# Patient Record
Sex: FEMALE | Race: OTHER | Employment: UNEMPLOYED | URBAN - METROPOLITAN AREA
[De-identification: names, ages, dates, MRNs, and addresses within clinical notes are randomized per-mention and may not be internally consistent; named-entity substitution may affect disease eponyms.]

---

## 2018-01-08 ENCOUNTER — OFFICE VISIT (OUTPATIENT)
Dept: FAMILY MEDICINE CLINIC | Facility: CLINIC | Age: 58
End: 2018-01-08

## 2018-01-08 VITALS
TEMPERATURE: 99 F | BODY MASS INDEX: 30 KG/M2 | RESPIRATION RATE: 16 BRPM | HEART RATE: 75 BPM | WEIGHT: 161 LBS | SYSTOLIC BLOOD PRESSURE: 150 MMHG | OXYGEN SATURATION: 98 % | DIASTOLIC BLOOD PRESSURE: 90 MMHG

## 2018-01-08 DIAGNOSIS — J01.00 ACUTE NON-RECURRENT MAXILLARY SINUSITIS: Primary | ICD-10-CM

## 2018-01-08 DIAGNOSIS — R03.0 ELEVATED BLOOD PRESSURE READING: ICD-10-CM

## 2018-01-08 DIAGNOSIS — R05.9 COUGH: ICD-10-CM

## 2018-01-08 PROCEDURE — 99202 OFFICE O/P NEW SF 15 MIN: CPT | Performed by: PHYSICIAN ASSISTANT

## 2018-01-08 RX ORDER — BENZONATATE 200 MG/1
200 CAPSULE ORAL 3 TIMES DAILY PRN
Qty: 15 CAPSULE | Refills: 0 | Status: SHIPPED | OUTPATIENT
Start: 2018-01-08 | End: 2018-08-23

## 2018-01-08 RX ORDER — AMOXICILLIN AND CLAVULANATE POTASSIUM 875; 125 MG/1; MG/1
1 TABLET, FILM COATED ORAL 2 TIMES DAILY
Qty: 10 TABLET | Refills: 0 | Status: SHIPPED | OUTPATIENT
Start: 2018-01-08 | End: 2018-01-13

## 2018-01-08 NOTE — PATIENT INSTRUCTIONS
Sinusitis: Autocuidados     Beber abundante cantidad de agua puede ayudar a drenar los senos paranasales.      La sinusitis suele poder controlarse con cuidados personales, saida por ejemplo mantener húmedos los senos paranasales para aumentar pryor comodidad Date Last Reviewed: 5/16/2014  © 1303-1433 [employer]. [address] [facility] [place] [facility]. Todos los derechos reservados.  Esta información no pretende sustituir la atención médica profesional. Sólo pryor médico puede diagnosticar y trata

## 2018-01-08 NOTE — PROGRESS NOTES
CHIEF COMPLAINT:   Patient presents with:  Cough: symptoms of nasal congestion and phlegm over 5 days, tried muscinex helped a little  Sinus Problem: sinus congestion and pain worsening over the last few days      HPI:   Adams White is a 61year old fem REVIEW OF SYSTEMS:   GENERAL:  good appetite  SKIN: no rashes or abnormal skin lesions  HEENT: See HPI.     LUNGS: denies shortness of breath or wheezing  CARDIOVASCULAR: denies chest pain or palpitations   GI: denies abdominal pain or nausea  NEURO: mi relieve pressure, motrin with food to decrease sinus inflammation. Risks, benefits, and side effects of medication explained and discussed. Medications as listed below.           benzonatate 200 MG Oral Cap      Amoxicillin-Pot Clavulanate 875-125 MG Oral

## 2018-07-14 ENCOUNTER — LAB ENCOUNTER (OUTPATIENT)
Dept: LAB | Age: 58
End: 2018-07-14
Attending: ORTHOPAEDIC SURGERY
Payer: COMMERCIAL

## 2018-07-14 DIAGNOSIS — Z01.818 PREOP TESTING: ICD-10-CM

## 2018-07-14 LAB
ANTIBODY SCREEN: NEGATIVE
RH BLOOD TYPE: POSITIVE

## 2018-07-14 PROCEDURE — 86850 RBC ANTIBODY SCREEN: CPT

## 2018-07-14 PROCEDURE — 86900 BLOOD TYPING SEROLOGIC ABO: CPT

## 2018-07-14 PROCEDURE — 86901 BLOOD TYPING SEROLOGIC RH(D): CPT

## 2018-07-14 PROCEDURE — 87641 MR-STAPH DNA AMP PROBE: CPT

## 2018-07-14 PROCEDURE — 36415 COLL VENOUS BLD VENIPUNCTURE: CPT

## 2018-07-15 LAB — MRSA DNA SPEC QL NAA+PROBE: NEGATIVE

## 2018-08-23 RX ORDER — ASCORBIC ACID 500 MG
500 TABLET ORAL DAILY
COMMUNITY

## 2018-08-23 RX ORDER — ACETAMINOPHEN 500 MG
500 TABLET ORAL EVERY 6 HOURS PRN
COMMUNITY

## 2018-08-23 RX ORDER — OMEGA-3-ACID ETHYL ESTERS 1 G/1
1 CAPSULE, LIQUID FILLED ORAL DAILY
COMMUNITY

## 2018-08-24 ENCOUNTER — LAB ENCOUNTER (OUTPATIENT)
Dept: LAB | Age: 58
End: 2018-08-24
Attending: ORTHOPAEDIC SURGERY
Payer: COMMERCIAL

## 2018-08-24 DIAGNOSIS — Z01.818 PRE-OP TESTING: ICD-10-CM

## 2018-08-24 LAB
ALBUMIN SERPL BCP-MCNC: 4.3 G/DL (ref 3.5–4.8)
ALBUMIN/GLOB SERPL: 1.8 {RATIO} (ref 1–2)
ALP SERPL-CCNC: 58 U/L (ref 32–100)
ALT SERPL-CCNC: 16 U/L (ref 14–54)
ANION GAP SERPL CALC-SCNC: 9 MMOL/L (ref 0–18)
ANTIBODY SCREEN: NEGATIVE
APTT PPP: 31.4 SECONDS (ref 23.2–35.3)
AST SERPL-CCNC: 16 U/L (ref 15–41)
BASOPHILS # BLD: 0.1 K/UL (ref 0–0.2)
BASOPHILS NFR BLD: 1 %
BILIRUB SERPL-MCNC: 0.5 MG/DL (ref 0.3–1.2)
BILIRUB UR QL: NEGATIVE
BUN SERPL-MCNC: 13 MG/DL (ref 8–20)
BUN/CREAT SERPL: 21.7 (ref 10–20)
CALCIUM SERPL-MCNC: 9.5 MG/DL (ref 8.5–10.5)
CHLORIDE SERPL-SCNC: 105 MMOL/L (ref 95–110)
CLARITY UR: CLEAR
CO2 SERPL-SCNC: 22 MMOL/L (ref 22–32)
COLOR UR: YELLOW
CREAT SERPL-MCNC: 0.6 MG/DL (ref 0.5–1.5)
EOSINOPHIL # BLD: 0.1 K/UL (ref 0–0.7)
EOSINOPHIL NFR BLD: 1 %
ERYTHROCYTE [DISTWIDTH] IN BLOOD BY AUTOMATED COUNT: 13.4 % (ref 11–15)
GLOBULIN PLAS-MCNC: 2.4 G/DL (ref 2.5–3.7)
GLUCOSE SERPL-MCNC: 100 MG/DL (ref 70–99)
GLUCOSE UR-MCNC: NEGATIVE MG/DL
HCT VFR BLD AUTO: 41.8 % (ref 35–48)
HGB BLD-MCNC: 14.2 G/DL (ref 12–16)
HGB UR QL STRIP.AUTO: NEGATIVE
INR BLD: 1 (ref 0.9–1.2)
KETONES UR-MCNC: NEGATIVE MG/DL
LEUKOCYTE ESTERASE UR QL STRIP.AUTO: NEGATIVE
LYMPHOCYTES # BLD: 1.7 K/UL (ref 1–4)
LYMPHOCYTES NFR BLD: 32 %
MCH RBC QN AUTO: 31.1 PG (ref 27–32)
MCHC RBC AUTO-ENTMCNC: 33.9 G/DL (ref 32–37)
MCV RBC AUTO: 91.7 FL (ref 80–100)
MONOCYTES # BLD: 0.3 K/UL (ref 0–1)
MONOCYTES NFR BLD: 6 %
NEUTROPHILS # BLD AUTO: 3.1 K/UL (ref 1.8–7.7)
NEUTROPHILS NFR BLD: 60 %
NITRITE UR QL STRIP.AUTO: NEGATIVE
OSMOLALITY UR CALC.SUM OF ELEC: 282 MOSM/KG (ref 275–295)
PATIENT FASTING: YES
PH UR: 5 [PH] (ref 5–8)
PLATELET # BLD AUTO: 220 K/UL (ref 140–400)
PMV BLD AUTO: 10.4 FL (ref 7.4–10.3)
POTASSIUM SERPL-SCNC: 4.2 MMOL/L (ref 3.3–5.1)
PROT SERPL-MCNC: 6.7 G/DL (ref 5.9–8.4)
PROT UR-MCNC: NEGATIVE MG/DL
PROTHROMBIN TIME: 12.5 SECONDS (ref 11.8–14.5)
RBC # BLD AUTO: 4.56 M/UL (ref 3.7–5.4)
RH BLOOD TYPE: POSITIVE
SODIUM SERPL-SCNC: 136 MMOL/L (ref 136–144)
SP GR UR STRIP: 1.02 (ref 1–1.03)
UROBILINOGEN UR STRIP-ACNC: <2
VIT C UR-MCNC: NEGATIVE MG/DL
WBC # BLD AUTO: 5.2 K/UL (ref 4–11)

## 2018-08-24 PROCEDURE — 85730 THROMBOPLASTIN TIME PARTIAL: CPT

## 2018-08-24 PROCEDURE — 36415 COLL VENOUS BLD VENIPUNCTURE: CPT

## 2018-08-24 PROCEDURE — 85025 COMPLETE CBC W/AUTO DIFF WBC: CPT

## 2018-08-24 PROCEDURE — 80053 COMPREHEN METABOLIC PANEL: CPT

## 2018-08-24 PROCEDURE — 81003 URINALYSIS AUTO W/O SCOPE: CPT

## 2018-08-24 PROCEDURE — 85610 PROTHROMBIN TIME: CPT

## 2018-08-24 PROCEDURE — 86850 RBC ANTIBODY SCREEN: CPT

## 2018-08-24 PROCEDURE — 86900 BLOOD TYPING SEROLOGIC ABO: CPT

## 2018-08-24 PROCEDURE — 86901 BLOOD TYPING SEROLOGIC RH(D): CPT

## 2018-08-27 NOTE — H&P
[address] Patient Status:  Surgery Admit    1960 MRN [id number]   [place] [address] Attending Nimco Mcgee, *   Hosp Day # 0 PCP Isaias Shepherd MD 08/24/2018   K 4.2 08/24/2018    08/24/2018   CO2 22 08/24/2018    (H) 08/24/2018   CA 9.5 08/24/2018   ALB 4.3 08/24/2018   ALKPHO 58 08/24/2018   BILT 0.5 08/24/2018   TP 6.7 08/24/2018   AST 16 08/24/2018   ALT 16 08/24/2018   PTT 31.4 08/2

## 2018-08-28 ENCOUNTER — HOSPITAL ENCOUNTER (INPATIENT)
Facility: HOSPITAL | Age: 58
LOS: 2 days | Discharge: HOME HEALTH | DRG: 470 | End: 2018-08-30
Attending: ORTHOPAEDIC SURGERY | Admitting: ORTHOPAEDIC SURGERY
Payer: COMMERCIAL

## 2018-08-28 ENCOUNTER — ANESTHESIA (OUTPATIENT)
Dept: SURGERY | Facility: HOSPITAL | Age: 58
DRG: 470 | End: 2018-08-28
Payer: COMMERCIAL

## 2018-08-28 ENCOUNTER — ANESTHESIA EVENT (OUTPATIENT)
Dept: SURGERY | Facility: HOSPITAL | Age: 58
DRG: 470 | End: 2018-08-28
Payer: COMMERCIAL

## 2018-08-28 ENCOUNTER — APPOINTMENT (OUTPATIENT)
Dept: GENERAL RADIOLOGY | Facility: HOSPITAL | Age: 58
DRG: 470 | End: 2018-08-28
Attending: PHYSICIAN ASSISTANT
Payer: COMMERCIAL

## 2018-08-28 ENCOUNTER — SURGERY (OUTPATIENT)
Age: 58
End: 2018-08-28

## 2018-08-28 DIAGNOSIS — Z01.818 PRE-OP TESTING: Primary | ICD-10-CM

## 2018-08-28 DIAGNOSIS — M17.11 OSTEOARTHRITIS OF RIGHT KNEE: ICD-10-CM

## 2018-08-28 LAB
ERYTHROCYTE [DISTWIDTH] IN BLOOD BY AUTOMATED COUNT: 13.6 % (ref 11–15)
HCT VFR BLD AUTO: 39 % (ref 35–48)
HGB BLD-MCNC: 13 G/DL (ref 12–16)
MCH RBC QN AUTO: 30.8 PG (ref 27–32)
MCHC RBC AUTO-ENTMCNC: 33.5 G/DL (ref 32–37)
MCV RBC AUTO: 92.1 FL (ref 80–100)
PLATELET # BLD AUTO: 173 K/UL (ref 140–400)
PMV BLD AUTO: 9.4 FL (ref 7.4–10.3)
RBC # BLD AUTO: 4.23 M/UL (ref 3.7–5.4)
WBC # BLD AUTO: 5.9 K/UL (ref 4–11)

## 2018-08-28 PROCEDURE — 73560 X-RAY EXAM OF KNEE 1 OR 2: CPT | Performed by: PHYSICIAN ASSISTANT

## 2018-08-28 PROCEDURE — 0SRC0J9 REPLACEMENT OF RIGHT KNEE JOINT WITH SYNTHETIC SUBSTITUTE, CEMENTED, OPEN APPROACH: ICD-10-PCS | Performed by: ORTHOPAEDIC SURGERY

## 2018-08-28 PROCEDURE — 99232 SBSQ HOSP IP/OBS MODERATE 35: CPT | Performed by: HOSPITALIST

## 2018-08-28 DEVICE — RESURFACING PATELLA SIZE 2
Type: IMPLANTABLE DEVICE | Site: KNEE | Status: FUNCTIONAL
Brand: GMK PRIMARY TOTAL KNEE SYSTEM

## 2018-08-28 DEVICE — BONE CEMENT HI VISCOSITY R+G: Type: IMPLANTABLE DEVICE | Site: KNEE | Status: FUNCTIONAL

## 2018-08-28 DEVICE — FIXED TIBIAL TRAY CEMENTED RIGHT, SIZE 2
Type: IMPLANTABLE DEVICE | Site: KNEE | Status: FUNCTIONAL
Brand: GMK PRIMARY TOTAL KNEE SYSTEM

## 2018-08-28 RX ORDER — DIPHENHYDRAMINE HCL 25 MG
25 CAPSULE ORAL EVERY 4 HOURS PRN
Status: ACTIVE | OUTPATIENT
Start: 2018-08-28 | End: 2018-08-29

## 2018-08-28 RX ORDER — ACETAMINOPHEN 500 MG
1000 TABLET ORAL ONCE
Status: DISCONTINUED | OUTPATIENT
Start: 2018-08-28 | End: 2018-08-28

## 2018-08-28 RX ORDER — HALOPERIDOL 5 MG/ML
0.5 INJECTION INTRAMUSCULAR ONCE AS NEEDED
Status: ACTIVE | OUTPATIENT
Start: 2018-08-28 | End: 2018-08-28

## 2018-08-28 RX ORDER — CELECOXIB 200 MG/1
200 CAPSULE ORAL EVERY 12 HOURS SCHEDULED
Status: DISCONTINUED | OUTPATIENT
Start: 2018-08-28 | End: 2018-08-30

## 2018-08-28 RX ORDER — MELATONIN
325
Status: DISCONTINUED | OUTPATIENT
Start: 2018-08-29 | End: 2018-08-30

## 2018-08-28 RX ORDER — FAMOTIDINE 20 MG/1
20 TABLET ORAL ONCE
Status: COMPLETED | OUTPATIENT
Start: 2018-08-28 | End: 2018-08-28

## 2018-08-28 RX ORDER — EPHEDRINE SULFATE 50 MG/ML
INJECTION, SOLUTION INTRAVENOUS AS NEEDED
Status: DISCONTINUED | OUTPATIENT
Start: 2018-08-28 | End: 2018-08-28 | Stop reason: SURG

## 2018-08-28 RX ORDER — BISACODYL 10 MG
10 SUPPOSITORY, RECTAL RECTAL
Status: DISCONTINUED | OUTPATIENT
Start: 2018-08-28 | End: 2018-08-30

## 2018-08-28 RX ORDER — DIPHENHYDRAMINE HYDROCHLORIDE 50 MG/ML
12.5 INJECTION INTRAMUSCULAR; INTRAVENOUS EVERY 4 HOURS PRN
Status: DISCONTINUED | OUTPATIENT
Start: 2018-08-28 | End: 2018-08-30

## 2018-08-28 RX ORDER — FAMOTIDINE 20 MG/1
20 TABLET ORAL 2 TIMES DAILY
Status: DISCONTINUED | OUTPATIENT
Start: 2018-08-28 | End: 2018-08-30

## 2018-08-28 RX ORDER — ONDANSETRON 2 MG/ML
4 INJECTION INTRAMUSCULAR; INTRAVENOUS ONCE AS NEEDED
Status: ACTIVE | OUTPATIENT
Start: 2018-08-28 | End: 2018-08-28

## 2018-08-28 RX ORDER — SODIUM CHLORIDE, SODIUM LACTATE, POTASSIUM CHLORIDE, CALCIUM CHLORIDE 600; 310; 30; 20 MG/100ML; MG/100ML; MG/100ML; MG/100ML
INJECTION, SOLUTION INTRAVENOUS CONTINUOUS
Status: DISCONTINUED | OUTPATIENT
Start: 2018-08-28 | End: 2018-08-30

## 2018-08-28 RX ORDER — ONDANSETRON 2 MG/ML
4 INJECTION INTRAMUSCULAR; INTRAVENOUS EVERY 4 HOURS PRN
Status: DISCONTINUED | OUTPATIENT
Start: 2018-08-28 | End: 2018-08-30

## 2018-08-28 RX ORDER — HYDROCODONE BITARTRATE AND ACETAMINOPHEN 7.5; 325 MG/1; MG/1
2 TABLET ORAL EVERY 6 HOURS PRN
Status: ACTIVE | OUTPATIENT
Start: 2018-08-28 | End: 2018-08-29

## 2018-08-28 RX ORDER — ACETAMINOPHEN 325 MG/1
650 TABLET ORAL EVERY 4 HOURS PRN
Status: DISCONTINUED | OUTPATIENT
Start: 2018-08-28 | End: 2018-08-30

## 2018-08-28 RX ORDER — BUPIVACAINE HYDROCHLORIDE AND EPINEPHRINE 5; 5 MG/ML; UG/ML
INJECTION, SOLUTION PERINEURAL AS NEEDED
Status: DISCONTINUED | OUTPATIENT
Start: 2018-08-28 | End: 2018-08-28 | Stop reason: HOSPADM

## 2018-08-28 RX ORDER — GABAPENTIN 300 MG/1
300 CAPSULE ORAL NIGHTLY
Status: DISCONTINUED | OUTPATIENT
Start: 2018-08-28 | End: 2018-08-30

## 2018-08-28 RX ORDER — HYDROCODONE BITARTRATE AND ACETAMINOPHEN 7.5; 325 MG/1; MG/1
1 TABLET ORAL EVERY 4 HOURS PRN
Status: DISCONTINUED | OUTPATIENT
Start: 2018-08-28 | End: 2018-08-30

## 2018-08-28 RX ORDER — POLYETHYLENE GLYCOL 3350 17 G/17G
17 POWDER, FOR SOLUTION ORAL DAILY PRN
Status: DISCONTINUED | OUTPATIENT
Start: 2018-08-28 | End: 2018-08-30

## 2018-08-28 RX ORDER — HYDROCODONE BITARTRATE AND ACETAMINOPHEN 7.5; 325 MG/1; MG/1
2 TABLET ORAL EVERY 4 HOURS PRN
Status: DISCONTINUED | OUTPATIENT
Start: 2018-08-28 | End: 2018-08-30

## 2018-08-28 RX ORDER — CEFAZOLIN SODIUM/WATER 2 G/20 ML
2 SYRINGE (ML) INTRAVENOUS ONCE
Status: COMPLETED | OUTPATIENT
Start: 2018-08-28 | End: 2018-08-28

## 2018-08-28 RX ORDER — MIDAZOLAM HYDROCHLORIDE 1 MG/ML
INJECTION INTRAMUSCULAR; INTRAVENOUS AS NEEDED
Status: DISCONTINUED | OUTPATIENT
Start: 2018-08-28 | End: 2018-08-28 | Stop reason: SURG

## 2018-08-28 RX ORDER — HYDROCODONE BITARTRATE AND ACETAMINOPHEN 7.5; 325 MG/1; MG/1
1 TABLET ORAL EVERY 6 HOURS PRN
Status: DISPENSED | OUTPATIENT
Start: 2018-08-28 | End: 2018-08-29

## 2018-08-28 RX ORDER — DIPHENHYDRAMINE HCL 25 MG
25 CAPSULE ORAL EVERY 4 HOURS PRN
Status: DISCONTINUED | OUTPATIENT
Start: 2018-08-28 | End: 2018-08-30

## 2018-08-28 RX ORDER — CELECOXIB 200 MG/1
400 CAPSULE ORAL ONCE
Status: COMPLETED | OUTPATIENT
Start: 2018-08-28 | End: 2018-08-28

## 2018-08-28 RX ORDER — SODIUM PHOSPHATE, DIBASIC AND SODIUM PHOSPHATE, MONOBASIC 7; 19 G/133ML; G/133ML
1 ENEMA RECTAL ONCE AS NEEDED
Status: DISCONTINUED | OUTPATIENT
Start: 2018-08-28 | End: 2018-08-30

## 2018-08-28 RX ORDER — MORPHINE SULFATE 1 MG/ML
INJECTION, SOLUTION EPIDURAL; INTRATHECAL; INTRAVENOUS AS NEEDED
Status: DISCONTINUED | OUTPATIENT
Start: 2018-08-28 | End: 2018-08-28 | Stop reason: SURG

## 2018-08-28 RX ORDER — ACETAMINOPHEN 325 MG/1
650 TABLET ORAL EVERY 6 HOURS PRN
Status: ACTIVE | OUTPATIENT
Start: 2018-08-28 | End: 2018-08-29

## 2018-08-28 RX ORDER — METOCLOPRAMIDE 10 MG/1
10 TABLET ORAL ONCE
Status: COMPLETED | OUTPATIENT
Start: 2018-08-28 | End: 2018-08-28

## 2018-08-28 RX ORDER — NALBUPHINE HCL 10 MG/ML
2.5 AMPUL (ML) INJECTION EVERY 4 HOURS PRN
Status: ACTIVE | OUTPATIENT
Start: 2018-08-28 | End: 2018-08-29

## 2018-08-28 RX ORDER — METOCLOPRAMIDE HYDROCHLORIDE 5 MG/ML
10 INJECTION INTRAMUSCULAR; INTRAVENOUS EVERY 6 HOURS PRN
Status: DISCONTINUED | OUTPATIENT
Start: 2018-08-28 | End: 2018-08-30

## 2018-08-28 RX ORDER — ASPIRIN 325 MG
325 TABLET ORAL 2 TIMES DAILY
Status: DISCONTINUED | OUTPATIENT
Start: 2018-08-28 | End: 2018-08-30

## 2018-08-28 RX ORDER — SODIUM CHLORIDE 0.9 % (FLUSH) 0.9 %
10 SYRINGE (ML) INJECTION AS NEEDED
Status: DISCONTINUED | OUTPATIENT
Start: 2018-08-28 | End: 2018-08-30

## 2018-08-28 RX ORDER — BUPIVACAINE HYDROCHLORIDE 7.5 MG/ML
INJECTION, SOLUTION EPIDURAL; RETROBULBAR AS NEEDED
Status: DISCONTINUED | OUTPATIENT
Start: 2018-08-28 | End: 2018-08-28 | Stop reason: SURG

## 2018-08-28 RX ORDER — GABAPENTIN 300 MG/1
600 CAPSULE ORAL ONCE
Status: COMPLETED | OUTPATIENT
Start: 2018-08-28 | End: 2018-08-28

## 2018-08-28 RX ORDER — DIPHENHYDRAMINE HYDROCHLORIDE 50 MG/ML
25 INJECTION INTRAMUSCULAR; INTRAVENOUS ONCE AS NEEDED
Status: ACTIVE | OUTPATIENT
Start: 2018-08-28 | End: 2018-08-28

## 2018-08-28 RX ORDER — NALOXONE HYDROCHLORIDE 0.4 MG/ML
0.08 INJECTION, SOLUTION INTRAMUSCULAR; INTRAVENOUS; SUBCUTANEOUS
Status: ACTIVE | OUTPATIENT
Start: 2018-08-28 | End: 2018-08-29

## 2018-08-28 RX ORDER — FAMOTIDINE 10 MG/ML
20 INJECTION, SOLUTION INTRAVENOUS 2 TIMES DAILY
Status: DISCONTINUED | OUTPATIENT
Start: 2018-08-28 | End: 2018-08-30

## 2018-08-28 RX ORDER — DIPHENHYDRAMINE HYDROCHLORIDE 50 MG/ML
12.5 INJECTION INTRAMUSCULAR; INTRAVENOUS EVERY 4 HOURS PRN
Status: ACTIVE | OUTPATIENT
Start: 2018-08-28 | End: 2018-08-29

## 2018-08-28 RX ORDER — DOCUSATE SODIUM 100 MG/1
100 CAPSULE, LIQUID FILLED ORAL 2 TIMES DAILY
Status: DISCONTINUED | OUTPATIENT
Start: 2018-08-28 | End: 2018-08-30

## 2018-08-28 RX ORDER — ACETAMINOPHEN 500 MG
1000 TABLET ORAL ONCE
Status: DISCONTINUED | OUTPATIENT
Start: 2018-08-28 | End: 2018-08-28 | Stop reason: HOSPADM

## 2018-08-28 RX ORDER — DEXTROSE, SODIUM CHLORIDE, AND POTASSIUM CHLORIDE 5; .45; .15 G/100ML; G/100ML; G/100ML
INJECTION INTRAVENOUS CONTINUOUS
Status: DISCONTINUED | OUTPATIENT
Start: 2018-08-28 | End: 2018-08-30

## 2018-08-28 RX ORDER — SENNOSIDES 8.6 MG
17.2 TABLET ORAL NIGHTLY
Status: DISCONTINUED | OUTPATIENT
Start: 2018-08-28 | End: 2018-08-30

## 2018-08-28 RX ADMIN — EPHEDRINE SULFATE 10 MG: 50 INJECTION, SOLUTION INTRAVENOUS at 12:08:00

## 2018-08-28 RX ADMIN — SODIUM CHLORIDE, SODIUM LACTATE, POTASSIUM CHLORIDE, CALCIUM CHLORIDE: 600; 310; 30; 20 INJECTION, SOLUTION INTRAVENOUS at 11:11:00

## 2018-08-28 RX ADMIN — EPHEDRINE SULFATE 10 MG: 50 INJECTION, SOLUTION INTRAVENOUS at 12:20:00

## 2018-08-28 RX ADMIN — BUPIVACAINE HYDROCHLORIDE 1.2 ML: 7.5 INJECTION, SOLUTION EPIDURAL; RETROBULBAR at 11:20:00

## 2018-08-28 RX ADMIN — MIDAZOLAM HYDROCHLORIDE 2 MG: 1 INJECTION INTRAMUSCULAR; INTRAVENOUS at 11:17:00

## 2018-08-28 RX ADMIN — CEFAZOLIN SODIUM/WATER 2 G: 2 G/20 ML SYRINGE (ML) INTRAVENOUS at 11:36:00

## 2018-08-28 RX ADMIN — SODIUM CHLORIDE, SODIUM LACTATE, POTASSIUM CHLORIDE, CALCIUM CHLORIDE: 600; 310; 30; 20 INJECTION, SOLUTION INTRAVENOUS at 12:57:00

## 2018-08-28 RX ADMIN — MORPHINE SULFATE 0.25 MG: 1 INJECTION, SOLUTION EPIDURAL; INTRATHECAL; INTRAVENOUS at 11:20:00

## 2018-08-28 NOTE — ANESTHESIA POSTPROCEDURE EVALUATION
Patient: [place]    Procedure Summary     Date:  08/28/18 Room / Location:  [facility] OR 12 / [facility] OR    Anesthesia Start:  5717 Anesthesia Stop:  9736    Procedure:  KNEE TOTAL REPLACEMENT (Right ) Diagnosis:  (osteoarthritis right knee)    Surg

## 2018-08-28 NOTE — ANESTHESIA PROCEDURE NOTES
Spinal Block  Performed by: Bonny Reddy  Authorized by: Aisha Hester     Patient Location:  OR  Start Time:  8/28/2018 11:17 AM  End Time:  8/28/2018 11:20 AM  Site identification: surface landmarks    Reason for Block: at surgeon's request, post-op pa

## 2018-08-28 NOTE — BRIEF OP NOTE
Pre-Operative Diagnosis: osteoarthritis right knee     Post-Operative Diagnosis: osteoarthritis right knee      Procedure Performed:   Procedure(s):  right total knee arthroplasty    Surgeon(s) and Role:     * Meisles, Octavia Leventhal, MD - Primary    Assist

## 2018-08-28 NOTE — OPERATIVE REPORT
[facility]    PATIENT'S NAME: Carlos Kelley   ATTENDING PHYSICIAN: Minda Harada, MD   OPERATING PHYSICIAN: Arminda Montelongo MD   PATIENT ACCOUNT#:   [id number]    LOCATION:  [address]  MEDICAL RECORD #:   [id number] was made first, and a patella protector was placed over the cut surface of the patella which was then subluxed into the lateral gutter.   The anterior and posterior cruciate ligaments were excised, and the femoral template was placed over the distal femur a preparations were made for the stemmed portion of the tibial component. The trochlear recess was cut for the femur. The trial components were then removed, and the bone was prepared with pulsatile lavage.   All three components were cemented in place el

## 2018-08-28 NOTE — INTERVAL H&P NOTE
Pre-op Diagnosis: osteoarthritis right knee    The above referenced H&P was reviewed by Kate Tran MD on 8/28/2018, the patient was examined and no significant changes have occurred in the patient's condition since the H&P was performed.   I dis

## 2018-08-28 NOTE — PROGRESS NOTES
[facility] HOSP - [facility]    Progress Note    [place] Patient Status:  Surgery Admit    1960 MRN [id number]   Location [address] UNIT Attending Jadiel Zuniga MD   Hosp Day # 0 PCP Lesley Cole MD  08/24/2018   CREATSERUM 0.60 08/24/2018   BUN 13 08/24/2018    08/24/2018   K 4.2 08/24/2018    08/24/2018   CO2 22 08/24/2018    (H) 08/24/2018   CA 9.5 08/24/2018   ALB 4.3 08/24/2018   ALKPHO 58 08/24/2018   BILT 0.5 08/24/2

## 2018-08-28 NOTE — ANESTHESIA PREPROCEDURE EVALUATION
Anesthesia PreOp Note    HPI:     Benitez Diaz is a 61year old female who presents for preoperative consultation requested by: Shruti Moran MD    Date of Surgery: 8/28/2018    Procedure(s):  KNEE TOTAL REPLACEMENT  Indication: osteoarthritis • Diabetes Father        Social History  Social History   Marital status:   Spouse name: N/A    Years of education: N/A  Number of children: N/A     Occupational History  None on file     Social History Main Topics   Smoking status: Current Some D Endo/Other    (+) arthritis  Abdominal  - normal exam             Anesthesia Plan:   ASA:  2  Plan:   Spinal  Post-op Pain Management: Intrathecal narcotics, Oral pain medication and IV analgesics  Informed Consent Plan and Risks Discussed With:  Patient a

## 2018-08-29 LAB
ERYTHROCYTE [DISTWIDTH] IN BLOOD BY AUTOMATED COUNT: 13.3 % (ref 11–15)
HCT VFR BLD AUTO: 34.9 % (ref 35–48)
HGB BLD-MCNC: 11.5 G/DL (ref 12–16)
MCH RBC QN AUTO: 31 PG (ref 27–32)
MCHC RBC AUTO-ENTMCNC: 33 G/DL (ref 32–37)
MCV RBC AUTO: 93.9 FL (ref 80–100)
PLATELET # BLD AUTO: 166 K/UL (ref 140–400)
PMV BLD AUTO: 9.7 FL (ref 7.4–10.3)
RBC # BLD AUTO: 3.71 M/UL (ref 3.7–5.4)
WBC # BLD AUTO: 7.3 K/UL (ref 4–11)

## 2018-08-29 PROCEDURE — 99233 SBSQ HOSP IP/OBS HIGH 50: CPT | Performed by: HOSPITALIST

## 2018-08-29 RX ORDER — TEMAZEPAM 15 MG/1
15 CAPSULE ORAL NIGHTLY PRN
Status: DISCONTINUED | OUTPATIENT
Start: 2018-08-29 | End: 2018-08-30

## 2018-08-29 RX ORDER — 0.9 % SODIUM CHLORIDE 0.9 %
VIAL (ML) INJECTION
Status: COMPLETED
Start: 2018-08-29 | End: 2018-08-29

## 2018-08-29 RX ORDER — HYDROMORPHONE HYDROCHLORIDE 1 MG/ML
0.5 INJECTION, SOLUTION INTRAMUSCULAR; INTRAVENOUS; SUBCUTANEOUS EVERY 2 HOUR PRN
Status: DISCONTINUED | OUTPATIENT
Start: 2018-08-29 | End: 2018-08-30

## 2018-08-29 NOTE — PHYSICAL THERAPY NOTE
PHYSICAL THERAPY KNEE TREATMENT NOTE - INPATIENT     Room Number: 426/426-A             Presenting Problem: right tka    Problem List  Principal Problem:    Primary osteoarthritis of right knee      ASSESSMENT   Pt seen for PT treatment session.  Pt agreeab Degree of Impairment Score: 46.58%   Standardized Score (AM-PAC Scale): 43.63   CMS Modifier (G-Code): CK    FUNCTIONAL ABILITY STATUS  Gait Assessment   Gait Assistance: Minimum assistance (cga)  Distance (ft): 300  Assistive Device: Rolling walker  Patte

## 2018-08-29 NOTE — PROGRESS NOTES
[facility]    Progress Note    [place] Patient Status:  Inpatient    1960 MRN [id number]   Location [facility] 4W/SW/SE Attending Kong Menchaca MD   Hosp Day # 1 PCP Lisa Gama MD       Subjective:   C Current PRN Inpatient Meds:      temazepam, Normal Saline Flush, sodium chloride 0.9%, PEG 3350, magnesium hydroxide, bisacodyl, FLEET ENEMA, ondansetron HCl, Metoclopramide HCl, Prochlorperazine Edisylate, diphenhydrAMINE **OR** DiphenhydrAMINE HCl, spent, >50% spent counseling re: treatment plan and workup  Arslan Buckley MD

## 2018-08-29 NOTE — PHYSICAL THERAPY NOTE
PHYSICAL THERAPY KNEE EVALUATION - INPATIENT       Room Number: 426/426-A  Evaluation Date: 8/29/2018  Type of Evaluation: Initial  Physician Order: PT Eval and Treat    Presenting Problem: right tka  Reason for Therapy: Mobility Dysfunction and Discharge None    Prior Level of [place]: Pt indep with mobility without ad. SUBJECTIVE  I know I have to walk, walk more.      PHYSICAL THERAPY EXAMINATION     OBJECTIVE  Precautions: Limb alert - right  Fall Risk: Standard fall risk    WEIGHT BEARING RESTR 46.58%   Standardized Score (AM-PAC Scale): 43.63   CMS Modifier (G-Code): CK    FUNCTIONAL ABILITY STATUS  Gait Assessment   Gait Assistance: Minimum assistance (cga)  Distance (ft): 100, 20  Assistive Device: Rolling walker  Pattern: R Decreased stance t

## 2018-08-29 NOTE — PAYOR COMM NOTE
8/29/18  Subjective:   [place] is feeling well  No CP  No SOB        Objective:        08/28/18  1650 08/28/18  2050 08/29/18  0458 08/29/18  0800   BP: 108/59 124/61 109/54 102/60   BP Location: Right arm Right arm Right arm Right arm   Pulse: 70 6 hydrocodone-acetaminophen     Results:      Lab Results  Component Value Date   WBC 7.3 08/29/2018   HGB 11.5 (L) 08/29/2018   HCT 34.9 (L) 08/29/2018    08/29/2018              Recent Labs   Lab  08/24/18   1043  08/28/18   1348  08/29/18   0506 Senna (SENOKOT) tab TABS 17.2 mg     Date Action Dose Route     8/28/2018 2044 Given 17.2 mg Oral       Vancomycin HCl (VANCOCIN) 1,000 mg in sodium chloride 0.9 % 250 mL IVPB add-vantage     Date Action Dose Route     8/28/2018 2220 New Bag 1000 mg Intr

## 2018-08-29 NOTE — OCCUPATIONAL THERAPY NOTE
OCCUPATIONAL THERAPY EVALUATION - INPATIENT      Room Number: 426/426-A  Evaluation Date: 8/29/2018  Type of Evaluation: Initial  Presenting Problem: R TKA    Physician Order: IP Consult to Occupational Therapy  Reason for Therapy: ADL/IADL Dysfunction and Spouse; Family    Toilet and Equipment: Standard height toilet  Shower/Tub and Equipment: Tub-shower combo          Hand Dominance: Right  Drives: Yes  Patient Regularly Uses: None    Stairs in Home: 10 [address]  Assistive Device(s) Used: none     Prior Level of Sit : Not tested  Sit to Stand: Minimum assistance (CGA)  Toilet Transfer: CGA  Shower Transfer: NT  Chair Transfer: CGA  Car Transfer: NT    Bedroom Mobility: CGA with RW x40 ft x15 ft     BALANCE ASSESSMENT  Static Sitting: good  Dynamic Sitting: fair  S

## 2018-08-29 NOTE — CM/SW NOTE
SW received order for s/p total joint and fresh post op. SW met w/ pt to discuss discharge plans. Pt lives at home w/ her  and adult children in [place]. Pt lives in a 1 level house w/ 9 external steps.  Pt is independent w/ all ADL's and does not ow

## 2018-08-29 NOTE — PROGRESS NOTES
Jess  66. Patient Status:  Inpatient    1960 MRN [id number]   Location [facility] 4W/SW/SE Attending Adan Shelby MD   Hosp Day # 1 PCP [address], MD     Subjective:  Post-Operative Day: 1 Status Post

## 2018-08-29 NOTE — ANESTHESIA POST-OP FOLLOW-UP NOTE
[facility] AND [facility]    Patients Name: Nikole Parkinson  Attending Physician: José Manuel Hicks MD  CSN: [id number]    Location:  426/426-A  MRN: [id number]    YOB: 1960  Admission Date: 8/28/2018     Anesthesia Pain Progress Note    Post-Op Day

## 2018-08-30 VITALS
TEMPERATURE: 98 F | DIASTOLIC BLOOD PRESSURE: 60 MMHG | WEIGHT: 160 LBS | HEIGHT: 60 IN | HEART RATE: 78 BPM | OXYGEN SATURATION: 97 % | BODY MASS INDEX: 31.41 KG/M2 | RESPIRATION RATE: 16 BRPM | SYSTOLIC BLOOD PRESSURE: 117 MMHG

## 2018-08-30 LAB
ERYTHROCYTE [DISTWIDTH] IN BLOOD BY AUTOMATED COUNT: 13.1 % (ref 11–15)
HCT VFR BLD AUTO: 34 % (ref 35–48)
HGB BLD-MCNC: 11.5 G/DL (ref 12–16)
MCH RBC QN AUTO: 31.5 PG (ref 27–32)
MCHC RBC AUTO-ENTMCNC: 33.8 G/DL (ref 32–37)
MCV RBC AUTO: 93.2 FL (ref 80–100)
PLATELET # BLD AUTO: 171 K/UL (ref 140–400)
PMV BLD AUTO: 10.5 FL (ref 7.4–10.3)
RBC # BLD AUTO: 3.65 M/UL (ref 3.7–5.4)
WBC # BLD AUTO: 5.8 K/UL (ref 4–11)

## 2018-08-30 PROCEDURE — 99239 HOSP IP/OBS DSCHRG MGMT >30: CPT | Performed by: HOSPITALIST

## 2018-08-30 RX ORDER — HYDROCODONE BITARTRATE AND ACETAMINOPHEN 7.5; 325 MG/1; MG/1
1 TABLET ORAL EVERY 4 HOURS PRN
Qty: 50 TABLET | Refills: 0 | Status: SHIPPED | OUTPATIENT
Start: 2018-08-30 | End: 2021-01-28 | Stop reason: ALTCHOICE

## 2018-08-30 RX ORDER — CYCLOBENZAPRINE HCL 10 MG
10 TABLET ORAL 3 TIMES DAILY PRN
Status: DISCONTINUED | OUTPATIENT
Start: 2018-08-30 | End: 2018-08-30

## 2018-08-30 RX ORDER — CELECOXIB 200 MG/1
200 CAPSULE ORAL EVERY 12 HOURS SCHEDULED
Qty: 30 CAPSULE | Refills: 0 | Status: SHIPPED | OUTPATIENT
Start: 2018-08-30 | End: 2021-01-28 | Stop reason: ALTCHOICE

## 2018-08-30 RX ORDER — CYCLOBENZAPRINE HCL 10 MG
10 TABLET ORAL 3 TIMES DAILY PRN
Qty: 15 TABLET | Refills: 0 | Status: SHIPPED | OUTPATIENT
Start: 2018-08-30 | End: 2021-01-28 | Stop reason: ALTCHOICE

## 2018-08-30 RX ORDER — PSEUDOEPHEDRINE HCL 30 MG
100 TABLET ORAL 2 TIMES DAILY
Qty: 10 CAPSULE | Refills: 0 | Status: SHIPPED | OUTPATIENT
Start: 2018-08-30 | End: 2021-01-28 | Stop reason: ALTCHOICE

## 2018-08-30 RX ORDER — ASPIRIN 325 MG
325 TABLET ORAL 2 TIMES DAILY
Qty: 30 TABLET | Refills: 0 | Status: SHIPPED | OUTPATIENT
Start: 2018-08-30 | End: 2021-01-28 | Stop reason: ALTCHOICE

## 2018-08-30 RX ORDER — MELATONIN
325
Qty: 20 TABLET | Refills: 0 | Status: SHIPPED | OUTPATIENT
Start: 2018-08-30 | End: 2021-01-28 | Stop reason: ALTCHOICE

## 2018-08-30 RX ORDER — GABAPENTIN 300 MG/1
300 CAPSULE ORAL NIGHTLY
Qty: 20 CAPSULE | Refills: 0 | Status: SHIPPED | OUTPATIENT
Start: 2018-08-30 | End: 2021-01-28 | Stop reason: ALTCHOICE

## 2018-08-30 NOTE — PROGRESS NOTES
[facility]    Progress Note    [place] Patient Status:  Inpatient    1960 MRN [id number]   Location [facility] 4W/SW/SE Attending Nata Henriquez MD   Hosp Day # 2 PCP Cece Vargas MD       Subjective:   C

## 2018-08-30 NOTE — PHYSICAL THERAPY NOTE
PHYSICAL THERAPY KNEE TREATMENT NOTE - INPATIENT     Room Number: 426/426-A             Presenting Problem: right tka    Problem List  Principal Problem:    Primary osteoarthritis of right knee      ASSESSMENT   Pt seen for PT treatment session.  Pt educate steps with a railing?: A Little     AM-PAC Score:  Raw Score: 23   PT Approx Degree of Impairment Score: 11.2%   Standardized Score (AM-PAC Scale): 56.93   CMS Modifier (G-Code): CI    FUNCTIONAL ABILITY STATUS  Gait Assessment   Gait Assistance: Modified progress   Goal #6 Patient independently performs home exercise program for ROM/strengthening per the instructions provided in preparation for discharge.    Goal #6  Current Status  in progress

## 2018-08-30 NOTE — CM/SW NOTE
8/30CM- The Patient's RN informed this Writer that the Patient is medically stable for discharge today(8/30). Case Management previously noted that a referral was made to RENETTA Godinez.   This Writer informed  RENETTA Godinez ([phone] fax [phone])  that the [facility]

## 2018-08-30 NOTE — DISCHARGE SUMMARY
[facility]    Discharge Summary    [place] Patient Status:  Inpatient    1960 MRN [id number]   Location [facility] 4W/SW/SE Attending Nilsa Saini MD   Hosp Day # 2 PCP Larry Campos MD     Date of Adm mg total) by mouth 3 (three) times daily as needed for Muscle spasms. Quantity:  15 tablet  Refills:  0     docusate sodium 100 MG Caps  Commonly known as:  COLACE      Take 100 mg by mouth 2 (two) times daily.    Quantity:  10 capsule  Refills:  0     fe Instructions: None  >30 MIN SPENT ON THIS DC   ARNULFO MONTANO  8/30/2018  11:18 AM

## 2018-08-30 NOTE — OCCUPATIONAL THERAPY NOTE
OCCUPATIONAL THERAPY TREATMENT NOTE - INPATIENT    Room Number: 426/426-A         Presenting Problem: R TKA     Problem List  Principal Problem:    Primary osteoarthritis of right knee      ASSESSMENT   Pt seen up in bed and performed supine to sit with pryor Taking care of personal grooming such as brushing teeth?: None  -   Eating meals?: None    AM-PAC Score:  Score: 23  Approx Degree of Impairment: 15.86%  Standardized Score (AM-PAC Scale): 51.12  CMS Modifier (G-Code): CI    FUNCTIONAL TRANSFER ASSESSMENT

## 2018-08-31 NOTE — PAYOR COMM NOTE
VALIDATION # [id number] - APPROVED 8/28/18-8/29/18 - CLINICAL UPDATE FOR 8/29/18 FAXED ON 8/29/18 - DATE OF DISCHARGE: 8/30/18- REQUESTING FAX APPROVAL FOR ADDITIONAL DAYS.  Angeline Elizabeth

## 2019-11-14 ENCOUNTER — TELEPHONE (OUTPATIENT)
Dept: SURGERY | Age: 59
End: 2019-11-14

## 2019-11-14 ENCOUNTER — OFFICE VISIT (OUTPATIENT)
Dept: SURGERY | Age: 59
End: 2019-11-14

## 2019-11-14 DIAGNOSIS — K60.2 ANAL FISSURE: Primary | ICD-10-CM

## 2019-11-14 PROCEDURE — 99204 OFFICE O/P NEW MOD 45 MIN: CPT | Performed by: SURGERY

## 2019-11-14 RX ORDER — LIDOCAINE 50 MG/G
OINTMENT TOPICAL PRN
Qty: 35.44 G | Refills: 0 | Status: SHIPPED | OUTPATIENT
Start: 2019-11-14

## 2019-11-14 RX ORDER — GABAPENTIN 100 MG/1
CAPSULE ORAL
COMMUNITY
Start: 2019-10-19

## 2019-11-14 RX ORDER — OMEPRAZOLE 40 MG/1
CAPSULE, DELAYED RELEASE ORAL
COMMUNITY
Start: 2019-08-28

## 2019-11-14 RX ORDER — CIPROFLOXACIN 500 MG/1
TABLET, FILM COATED ORAL
COMMUNITY
Start: 2019-09-26

## 2019-11-14 RX ORDER — ASPIRIN 325 MG
325 TABLET ORAL
COMMUNITY
Start: 2018-08-30

## 2019-11-14 RX ORDER — PSEUDOEPHEDRINE HCL 30 MG
100 TABLET ORAL
COMMUNITY
Start: 2018-08-30

## 2019-11-14 RX ORDER — HYDROCHLOROTHIAZIDE 12.5 MG/1
12.5 TABLET ORAL
COMMUNITY

## 2019-11-14 RX ORDER — CLOTRIMAZOLE AND BETAMETHASONE DIPROPIONATE 10; .64 MG/G; MG/G
CREAM TOPICAL
COMMUNITY
Start: 2019-10-15

## 2019-11-14 RX ORDER — DICYCLOMINE HYDROCHLORIDE 10 MG/1
CAPSULE ORAL
COMMUNITY
Start: 2019-08-29

## 2019-11-14 RX ORDER — CYCLOBENZAPRINE HCL 10 MG
10 TABLET ORAL
COMMUNITY
Start: 2018-08-30

## 2019-11-14 RX ORDER — CLOBETASOL PROPIONATE 0.5 MG/G
CREAM TOPICAL
COMMUNITY
Start: 2019-11-07

## 2019-11-14 RX ORDER — DIPHENOXYLATE HYDROCHLORIDE AND ATROPINE SULFATE 2.5; .025 MG/1; MG/1
TABLET ORAL
Refills: 1 | COMMUNITY
Start: 2019-09-26

## 2019-11-14 RX ORDER — LANOLIN ALCOHOL/MO/W.PET/CERES
325 CREAM (GRAM) TOPICAL
COMMUNITY
Start: 2018-08-30

## 2019-11-14 RX ORDER — ACETAMINOPHEN 500 MG
500 TABLET ORAL
COMMUNITY

## 2019-11-14 RX ORDER — CELECOXIB 200 MG/1
200 CAPSULE ORAL
COMMUNITY
Start: 2018-08-30

## 2019-11-14 RX ORDER — ASCORBIC ACID 500 MG
500 TABLET ORAL
COMMUNITY

## 2019-11-14 RX ORDER — LISINOPRIL 20 MG/1
20 TABLET ORAL
COMMUNITY
Start: 2018-11-13

## 2019-11-14 RX ORDER — OMEGA-3-ACID ETHYL ESTERS 1 G/1
1 CAPSULE, LIQUID FILLED ORAL
COMMUNITY

## 2019-11-14 RX ORDER — CLONAZEPAM 0.25 MG/1
0.25 TABLET, ORALLY DISINTEGRATING ORAL
COMMUNITY
Start: 2019-07-12

## 2019-11-14 ASSESSMENT — ENCOUNTER SYMPTOMS
PSYCHIATRIC NEGATIVE: 1
ENDOCRINE NEGATIVE: 1
NEUROLOGICAL NEGATIVE: 1
EYES NEGATIVE: 1
CONSTITUTIONAL NEGATIVE: 1
RESPIRATORY NEGATIVE: 1

## 2019-11-14 ASSESSMENT — PAIN SCALES - GENERAL: PAINLEVEL: 0

## 2019-12-03 DIAGNOSIS — K60.2 ANAL FISSURE: Primary | ICD-10-CM

## 2019-12-03 PROCEDURE — 46505 CHEMODENERVATION ANAL MUSC: CPT | Performed by: SURGERY

## 2019-12-03 RX ORDER — TRAMADOL HYDROCHLORIDE 50 MG/1
50 TABLET ORAL EVERY 6 HOURS PRN
Qty: 20 TABLET | Refills: 0 | Status: SHIPPED | OUTPATIENT
Start: 2019-12-03 | End: 2019-12-13 | Stop reason: ALTCHOICE

## 2019-12-03 RX ORDER — DOCUSATE SODIUM 100 MG/1
100 CAPSULE, LIQUID FILLED ORAL 2 TIMES DAILY
Qty: 60 CAPSULE | Refills: 11 | Status: SHIPPED | OUTPATIENT
Start: 2019-12-03

## 2019-12-03 RX ORDER — KETOROLAC TROMETHAMINE 10 MG/1
10 TABLET, FILM COATED ORAL EVERY 6 HOURS PRN
Qty: 20 TABLET | Refills: 0 | Status: SHIPPED | OUTPATIENT
Start: 2019-12-03 | End: 2019-12-13 | Stop reason: ALTCHOICE

## 2019-12-04 ENCOUNTER — TELEPHONE (OUTPATIENT)
Dept: SURGERY | Age: 59
End: 2019-12-04

## 2019-12-05 ENCOUNTER — TELEPHONE (OUTPATIENT)
Dept: SURGERY | Age: 59
End: 2019-12-05

## 2019-12-07 ENCOUNTER — TELEPHONE (OUTPATIENT)
Dept: SCHEDULING | Age: 59
End: 2019-12-07

## 2019-12-08 PROCEDURE — 99283 EMERGENCY DEPT VISIT LOW MDM: CPT | Performed by: EMERGENCY MEDICINE

## 2019-12-08 PROCEDURE — 10060 I&D ABSCESS SIMPLE/SINGLE: CPT | Performed by: EMERGENCY MEDICINE

## 2019-12-09 ENCOUNTER — TELEPHONE (OUTPATIENT)
Dept: SURGERY | Age: 59
End: 2019-12-09

## 2019-12-09 RX ORDER — IBUPROFEN 600 MG/1
600 TABLET ORAL EVERY 8 HOURS PRN
Qty: 30 TABLET | Refills: 0 | Status: SHIPPED | OUTPATIENT
Start: 2019-12-09

## 2019-12-13 ENCOUNTER — APPOINTMENT (OUTPATIENT)
Dept: SURGERY | Age: 59
End: 2019-12-13

## 2019-12-13 ENCOUNTER — OFFICE VISIT (OUTPATIENT)
Dept: SURGERY | Age: 59
End: 2019-12-13

## 2019-12-13 DIAGNOSIS — K60.2 ANAL FISSURE: Primary | ICD-10-CM

## 2019-12-13 PROCEDURE — 99024 POSTOP FOLLOW-UP VISIT: CPT | Performed by: CLINICAL NURSE SPECIALIST

## 2019-12-13 RX ORDER — LISINOPRIL 20 MG/1
20 TABLET ORAL
COMMUNITY
Start: 2019-12-03

## 2019-12-13 RX ORDER — HYDROCODONE BITARTRATE AND ACETAMINOPHEN 7.5; 325 MG/1; MG/1
TABLET ORAL
Refills: 0 | COMMUNITY
Start: 2019-12-08

## 2019-12-13 ASSESSMENT — ENCOUNTER SYMPTOMS
FEVER: 0
CHILLS: 0
ROS GI COMMENTS: WITH BMS
NERVOUS/ANXIOUS: 1
ANAL BLEEDING: 1
RECTAL PAIN: 1

## 2019-12-13 ASSESSMENT — PAIN SCALES - GENERAL: PAINLEVEL: 1-2

## 2019-12-20 ENCOUNTER — APPOINTMENT (OUTPATIENT)
Dept: SURGERY | Age: 59
End: 2019-12-20

## 2020-01-13 ENCOUNTER — APPOINTMENT (OUTPATIENT)
Dept: SURGERY | Age: 60
End: 2020-01-13

## 2020-02-10 ENCOUNTER — OFFICE VISIT (OUTPATIENT)
Dept: SURGERY | Age: 60
End: 2020-02-10

## 2020-02-10 DIAGNOSIS — K60.2 ANAL FISSURE: Primary | ICD-10-CM

## 2020-02-10 PROCEDURE — 99213 OFFICE O/P EST LOW 20 MIN: CPT | Performed by: SURGERY

## 2020-02-10 ASSESSMENT — ENCOUNTER SYMPTOMS
EYES NEGATIVE: 1
CONSTITUTIONAL NEGATIVE: 1
ENDOCRINE NEGATIVE: 1
RESPIRATORY NEGATIVE: 1
PSYCHIATRIC NEGATIVE: 1
NEUROLOGICAL NEGATIVE: 1

## 2020-02-10 ASSESSMENT — PAIN SCALES - GENERAL: PAINLEVEL: 0

## 2021-01-28 ENCOUNTER — HOSPITAL ENCOUNTER (OUTPATIENT)
Dept: GENERAL RADIOLOGY | Facility: HOSPITAL | Age: 61
Discharge: HOME | End: 2021-01-28
Attending: INTERNAL MEDICINE
Payer: COMMERCIAL

## 2021-01-28 ENCOUNTER — OFFICE VISIT (OUTPATIENT)
Dept: FAMILY MEDICINE CLINIC | Facility: CLINIC | Age: 61
End: 2021-01-28
Payer: COMMERCIAL

## 2021-01-28 VITALS
DIASTOLIC BLOOD PRESSURE: 70 MMHG | WEIGHT: 150 LBS | OXYGEN SATURATION: 98 % | HEIGHT: 58.5 IN | HEART RATE: 88 BPM | SYSTOLIC BLOOD PRESSURE: 154 MMHG | BODY MASS INDEX: 30.65 KG/M2

## 2021-01-28 DIAGNOSIS — M54.2 NECK PAIN: ICD-10-CM

## 2021-01-28 DIAGNOSIS — E56.9 VITAMIN DEFICIENCY: ICD-10-CM

## 2021-01-28 DIAGNOSIS — Z00.00 PREVENTATIVE HEALTH CARE: ICD-10-CM

## 2021-01-28 DIAGNOSIS — R19.7 DIARRHEA, UNSPECIFIED TYPE: ICD-10-CM

## 2021-01-28 DIAGNOSIS — G89.29 CHRONIC RIGHT SHOULDER PAIN: Primary | ICD-10-CM

## 2021-01-28 DIAGNOSIS — G89.29 CHRONIC RIGHT SHOULDER PAIN: ICD-10-CM

## 2021-01-28 DIAGNOSIS — M25.511 CHRONIC RIGHT SHOULDER PAIN: Primary | ICD-10-CM

## 2021-01-28 DIAGNOSIS — M25.511 CHRONIC RIGHT SHOULDER PAIN: ICD-10-CM

## 2021-01-28 LAB
ALBUMIN SERPL-MCNC: 4.1 G/DL (ref 3.4–5)
ALBUMIN/GLOB SERPL: 1.2 {RATIO} (ref 1–2)
ALP LIVER SERPL-CCNC: 72 U/L
ALT SERPL-CCNC: 23 U/L
ANION GAP SERPL CALC-SCNC: 6 MMOL/L (ref 0–18)
AST SERPL-CCNC: 15 U/L (ref 15–37)
BASOPHILS # BLD AUTO: 0.06 X10(3) UL (ref 0–0.2)
BASOPHILS NFR BLD AUTO: 1 %
BILIRUB SERPL-MCNC: 0.3 MG/DL (ref 0.1–2)
BUN BLD-MCNC: 12 MG/DL (ref 7–18)
BUN/CREAT SERPL: 17.1 (ref 10–20)
CALCIUM BLD-MCNC: 9.3 MG/DL (ref 8.5–10.1)
CHLORIDE SERPL-SCNC: 111 MMOL/L (ref 98–112)
CHOLEST SMN-MCNC: 264 MG/DL (ref ?–200)
CO2 SERPL-SCNC: 22 MMOL/L (ref 21–32)
CREAT BLD-MCNC: 0.7 MG/DL
DEPRECATED RDW RBC AUTO: 42 FL (ref 35.1–46.3)
EOSINOPHIL # BLD AUTO: 0.06 X10(3) UL (ref 0–0.7)
EOSINOPHIL NFR BLD AUTO: 1 %
ERYTHROCYTE [DISTWIDTH] IN BLOOD BY AUTOMATED COUNT: 12.2 % (ref 11–15)
EST. AVERAGE GLUCOSE BLD GHB EST-MCNC: 108 MG/DL (ref 68–126)
GLOBULIN PLAS-MCNC: 3.4 G/DL (ref 2.8–4.4)
GLUCOSE BLD-MCNC: 83 MG/DL (ref 70–99)
HBA1C MFR BLD HPLC: 5.4 % (ref ?–5.7)
HCT VFR BLD AUTO: 42.3 %
HDLC SERPL-MCNC: 56 MG/DL (ref 40–59)
HGB BLD-MCNC: 13.8 G/DL
IMM GRANULOCYTES # BLD AUTO: 0.01 X10(3) UL (ref 0–1)
IMM GRANULOCYTES NFR BLD: 0.2 %
LDLC SERPL CALC-MCNC: 173 MG/DL (ref ?–100)
LYMPHOCYTES # BLD AUTO: 1.91 X10(3) UL (ref 1–4)
LYMPHOCYTES NFR BLD AUTO: 31 %
M PROTEIN MFR SERPL ELPH: 7.5 G/DL (ref 6.4–8.2)
MCH RBC QN AUTO: 30.1 PG (ref 26–34)
MCHC RBC AUTO-ENTMCNC: 32.6 G/DL (ref 31–37)
MCV RBC AUTO: 92.4 FL
MONOCYTES # BLD AUTO: 0.29 X10(3) UL (ref 0.1–1)
MONOCYTES NFR BLD AUTO: 4.7 %
NEUTROPHILS # BLD AUTO: 3.83 X10 (3) UL (ref 1.5–7.7)
NEUTROPHILS # BLD AUTO: 3.83 X10(3) UL (ref 1.5–7.7)
NEUTROPHILS NFR BLD AUTO: 62.1 %
NONHDLC SERPL-MCNC: 208 MG/DL (ref ?–130)
OSMOLALITY SERPL CALC.SUM OF ELEC: 287 MOSM/KG (ref 275–295)
PATIENT FASTING Y/N/NP: NO
PATIENT FASTING Y/N/NP: NO
PLATELET # BLD AUTO: 237 10(3)UL (ref 150–450)
POTASSIUM SERPL-SCNC: 4.1 MMOL/L (ref 3.5–5.1)
RBC # BLD AUTO: 4.58 X10(6)UL
SODIUM SERPL-SCNC: 139 MMOL/L (ref 136–145)
T4 FREE SERPL-MCNC: 1.1 NG/DL (ref 0.8–1.7)
TRIGL SERPL-MCNC: 177 MG/DL (ref 30–149)
TSI SER-ACNC: 0.2 MIU/ML (ref 0.36–3.74)
VLDLC SERPL CALC-MCNC: 35 MG/DL (ref 0–30)
WBC # BLD AUTO: 6.2 X10(3) UL (ref 4–11)

## 2021-01-28 PROCEDURE — 73030 X-RAY EXAM OF SHOULDER: CPT | Performed by: INTERNAL MEDICINE

## 2021-01-28 PROCEDURE — 90750 HZV VACC RECOMBINANT IM: CPT | Performed by: INTERNAL MEDICINE

## 2021-01-28 PROCEDURE — 3077F SYST BP >= 140 MM HG: CPT | Performed by: INTERNAL MEDICINE

## 2021-01-28 PROCEDURE — 83036 HEMOGLOBIN GLYCOSYLATED A1C: CPT | Performed by: INTERNAL MEDICINE

## 2021-01-28 PROCEDURE — 3008F BODY MASS INDEX DOCD: CPT | Performed by: INTERNAL MEDICINE

## 2021-01-28 PROCEDURE — 3078F DIAST BP <80 MM HG: CPT | Performed by: INTERNAL MEDICINE

## 2021-01-28 PROCEDURE — 90471 IMMUNIZATION ADMIN: CPT | Performed by: INTERNAL MEDICINE

## 2021-01-28 PROCEDURE — 72050 X-RAY EXAM NECK SPINE 4/5VWS: CPT | Performed by: INTERNAL MEDICINE

## 2021-01-28 PROCEDURE — 36415 COLL VENOUS BLD VENIPUNCTURE: CPT | Performed by: INTERNAL MEDICINE

## 2021-01-28 PROCEDURE — 80061 LIPID PANEL: CPT | Performed by: INTERNAL MEDICINE

## 2021-01-28 PROCEDURE — 84439 ASSAY OF FREE THYROXINE: CPT | Performed by: INTERNAL MEDICINE

## 2021-01-28 PROCEDURE — 99205 OFFICE O/P NEW HI 60 MIN: CPT | Performed by: INTERNAL MEDICINE

## 2021-01-28 PROCEDURE — 82306 VITAMIN D 25 HYDROXY: CPT | Performed by: INTERNAL MEDICINE

## 2021-01-28 PROCEDURE — 80050 GENERAL HEALTH PANEL: CPT | Performed by: INTERNAL MEDICINE

## 2021-01-28 RX ORDER — LISINOPRIL 20 MG/1
20 TABLET ORAL DAILY
COMMUNITY
Start: 2020-12-31

## 2021-01-28 RX ORDER — OMEPRAZOLE 40 MG/1
CAPSULE, DELAYED RELEASE ORAL
COMMUNITY
Start: 2019-08-28

## 2021-01-29 LAB — 25(OH)D3 SERPL-MCNC: 37.2 NG/ML (ref 30–100)

## 2021-02-01 NOTE — PROGRESS NOTES
[facility]  New Patient History and Physical      HPI:   Patient presents with:  Establish Care  Arm Pain: R arm      Nadia Souza is a 63year old female presenting for:  Establishment of care.   Has  has a past medical history of Total 37.2 30.0 - 100.0 ng/mL   CBC W/ DIFFERENTIAL   Result Value Ref Range    WBC 6.2 4.0 - 11.0 x10(3) uL    RBC 4.58 3.80 - 5.30 x10(6)uL    HGB 13.8 12.0 - 16.0 g/dL    HCT 42.3 35.0 - 48.0 %    MCV 92.4 80.0 - 100.0 fL    MCH 30.1 26.0 - 34.0 pg    M Non- : No      Diabetic: No      Tobacco smoker: Yes      Systolic Blood Pressure: 814 mmHg      Is BP treated: No      HDL Cholesterol: 56 mg/dL      Total Cholesterol: 264 mg/dL       Medications:  Current Outpatient Medications Cardiovascular: Negative for chest pain, palpitations and leg swelling. Gastrointestinal: Negative for nausea, abdominal pain, constipation, blood in stool and abdominal distention.    Endocrine: Negative for cold intolerance, heat intolerance and polyu friction rub. No murmur heard. Edema not present. Pulmonary/Chest: Effort normal and breath sounds normal. No respiratory distress. She has no wheezes. She has no rales. She exhibits no tenderness. Abdominal: Soft.  Bowel sounds are normal. She exhi this Visit:  Requested Prescriptions      No prescriptions requested or ordered in this encounter       Orders Placed This Encounter      CBC W Differential W Platelet [E]      Comp Metabolic Panel (14) [E]      Hemoglobin A1C (Glycohemoglobin) [E]      Renetta Lino

## 2021-02-02 ENCOUNTER — TELEPHONE (OUTPATIENT)
Dept: FAMILY MEDICINE CLINIC | Facility: CLINIC | Age: 61
End: 2021-02-02

## 2021-02-02 NOTE — TELEPHONE ENCOUNTER
Results were discussed with patient and verbalized understanding, she prefers not to start using Lipitor and will try taking care of her cholesterol by watching her diet and exercise more.   Patient has an appt next month to see Dr Rosalina Paulino and will ask to

## 2021-02-18 ENCOUNTER — TELEPHONE (OUTPATIENT)
Dept: FAMILY MEDICINE CLINIC | Facility: CLINIC | Age: 61
End: 2021-02-18

## 2021-02-18 NOTE — TELEPHONE ENCOUNTER
Results were discussed with patient, she was offered to start physical therapy but prefers to wait since she's afraid to come to the hospital for treatment. Patient has an upcoming appt and will discuss further treatment options during her visit.       ---

## 2021-03-17 ENCOUNTER — OFFICE VISIT (OUTPATIENT)
Dept: FAMILY MEDICINE CLINIC | Facility: CLINIC | Age: 61
End: 2021-03-17
Payer: COMMERCIAL

## 2021-03-17 VITALS
BODY MASS INDEX: 29.25 KG/M2 | OXYGEN SATURATION: 96 % | HEIGHT: 60 IN | WEIGHT: 149 LBS | HEART RATE: 97 BPM | SYSTOLIC BLOOD PRESSURE: 134 MMHG | DIASTOLIC BLOOD PRESSURE: 82 MMHG

## 2021-03-17 DIAGNOSIS — E78.2 MIXED HYPERLIPIDEMIA: Primary | ICD-10-CM

## 2021-03-17 DIAGNOSIS — E05.90 SUBCLINICAL HYPERTHYROIDISM: ICD-10-CM

## 2021-03-17 DIAGNOSIS — Z23 NEED FOR VACCINATION: ICD-10-CM

## 2021-03-17 DIAGNOSIS — I10 ESSENTIAL HYPERTENSION: ICD-10-CM

## 2021-03-17 PROCEDURE — 3079F DIAST BP 80-89 MM HG: CPT | Performed by: FAMILY MEDICINE

## 2021-03-17 PROCEDURE — 3075F SYST BP GE 130 - 139MM HG: CPT | Performed by: FAMILY MEDICINE

## 2021-03-17 PROCEDURE — 3008F BODY MASS INDEX DOCD: CPT | Performed by: FAMILY MEDICINE

## 2021-03-17 PROCEDURE — 99214 OFFICE O/P EST MOD 30 MIN: CPT | Performed by: FAMILY MEDICINE

## 2021-03-18 ENCOUNTER — TELEPHONE (OUTPATIENT)
Dept: INTERNAL MEDICINE CLINIC | Facility: CLINIC | Age: 61
End: 2021-03-18

## 2021-03-30 NOTE — PROGRESS NOTES
HPI:   Patient presents with:  Cholesterol: test results  Thyroid Problem: test results       Bentiez Diaz is a 63year old female presenting for:    HTN  -Taking meds as prescribed-tolerating well without SEs.   -Denies CP, Palpitations, Dizziness, le g/dL    HCT 42.3 35.0 - 48.0 %    MCV 92.4 80.0 - 100.0 fL    MCH 30.1 26.0 - 34.0 pg    MCHC 32.6 31.0 - 37.0 g/dL    RDW-SD 42.0 35.1 - 46.3 fL    RDW 12.2 11.0 - 15.0 %    .0 150.0 - 450.0 10(3)uL    Neutrophil Absolute Prelim 3.83 1.50 - 7.70 x1 Reported on 3/17/2021)     • acetaminophen 500 MG Oral Tab Take 500 mg by mouth every 6 (six) hours as needed for Pain. (Patient not taking: Reported on 3/17/2021 )     • Omega-3-acid Ethyl Esters 1 g Oral Cap Take 1 g by mouth daily.  (Patient not taking: kg)      Physical Exam  Vitals reviewed. Constitutional:       General: She is not in acute distress. Appearance: She is well-developed.    Eyes:      Conjunctiva/sclera: Conjunctivae normal.   Cardiovascular:      Rate and Rhythm: Normal rate and reg allergies, or worsening or changing symptoms as well as any side effects or complications from the treatments . Red flags/ ER precautions discussed.     Spent 30 minutes including chart review, reviewing appropriate medical history, evaluating patient, dis

## 2021-04-23 ENCOUNTER — NURSE ONLY (OUTPATIENT)
Dept: FAMILY MEDICINE CLINIC | Facility: CLINIC | Age: 61
End: 2021-04-23
Payer: COMMERCIAL

## 2021-04-23 DIAGNOSIS — E78.2 MIXED HYPERLIPIDEMIA: ICD-10-CM

## 2021-04-23 DIAGNOSIS — E05.90 SUBCLINICAL HYPERTHYROIDISM: ICD-10-CM

## 2021-04-23 PROCEDURE — 84443 ASSAY THYROID STIM HORMONE: CPT | Performed by: FAMILY MEDICINE

## 2021-04-23 PROCEDURE — 36415 COLL VENOUS BLD VENIPUNCTURE: CPT | Performed by: FAMILY MEDICINE

## 2021-04-23 PROCEDURE — 80061 LIPID PANEL: CPT | Performed by: FAMILY MEDICINE

## 2021-04-23 PROCEDURE — 84439 ASSAY OF FREE THYROXINE: CPT | Performed by: FAMILY MEDICINE

## 2021-04-23 PROCEDURE — 84481 FREE ASSAY (FT-3): CPT | Performed by: FAMILY MEDICINE

## 2021-04-27 ENCOUNTER — TELEPHONE (OUTPATIENT)
Dept: FAMILY MEDICINE CLINIC | Facility: CLINIC | Age: 61
End: 2021-04-27

## 2021-04-27 RX ORDER — ATORVASTATIN CALCIUM 20 MG/1
20 TABLET, FILM COATED ORAL NIGHTLY
Qty: 90 TABLET | Refills: 0 | Status: SHIPPED | OUTPATIENT
Start: 2021-04-27 | End: 2021-08-09

## 2021-04-27 NOTE — TELEPHONE ENCOUNTER
----- Message from Lesli Sidhu MD sent at 4/26/2021  5:57 AM CDT -----  Please let her know her thyroid is borderline elevated (subclinical). Will continue monitoring  Her cholesterol improved slighlty but its still pretty elevated.  I also sug

## 2021-05-26 VITALS
RESPIRATION RATE: 18 BRPM | SYSTOLIC BLOOD PRESSURE: 139 MMHG | HEART RATE: 87 BPM | BODY MASS INDEX: 30.24 KG/M2 | HEIGHT: 59 IN | TEMPERATURE: 97.5 F | WEIGHT: 150 LBS | SYSTOLIC BLOOD PRESSURE: 126 MMHG | DIASTOLIC BLOOD PRESSURE: 81 MMHG | TEMPERATURE: 97.7 F | WEIGHT: 150 LBS | HEIGHT: 59 IN | TEMPERATURE: 97.5 F | BODY MASS INDEX: 30.24 KG/M2 | HEART RATE: 76 BPM | DIASTOLIC BLOOD PRESSURE: 74 MMHG | BODY MASS INDEX: 30.24 KG/M2 | HEIGHT: 59 IN | SYSTOLIC BLOOD PRESSURE: 130 MMHG | DIASTOLIC BLOOD PRESSURE: 87 MMHG | RESPIRATION RATE: 18 BRPM | RESPIRATION RATE: 16 BRPM | WEIGHT: 150 LBS | HEART RATE: 76 BPM

## 2021-08-09 RX ORDER — ATORVASTATIN CALCIUM 20 MG/1
20 TABLET, FILM COATED ORAL NIGHTLY
Qty: 30 TABLET | Refills: 0 | Status: SHIPPED | OUTPATIENT
Start: 2021-08-09

## 2021-11-18 DIAGNOSIS — Z12.11 ENCOUNTER FOR SCREENING COLONOSCOPY: ICD-10-CM

## 2021-11-18 DIAGNOSIS — Z12.11 ENCOUNTER FOR FIT (FECAL IMMUNOCHEMICAL TEST) SCREENING: Primary | ICD-10-CM

## 2021-11-18 DIAGNOSIS — Z12.31 SCREENING MAMMOGRAM FOR BREAST CANCER: ICD-10-CM

## 2022-02-22 ENCOUNTER — TELEPHONE (OUTPATIENT)
Dept: SCHEDULING | Age: 62
End: 2022-02-22

## 2022-04-21 ENCOUNTER — TELEPHONE (OUTPATIENT)
Dept: INTERNAL MEDICINE CLINIC | Facility: CLINIC | Age: 62
End: 2022-04-21

## 2022-04-21 NOTE — TELEPHONE ENCOUNTER
L/m to pt stating that its time for her annual physical, to please contact our office to sched an appt.    Also a letter was mailed out

## 2023-10-04 ENCOUNTER — TELEPHONE (OUTPATIENT)
Dept: INTERNAL MEDICINE CLINIC | Facility: CLINIC | Age: 63
End: 2023-10-04

## 2023-10-16 ENCOUNTER — PATIENT OUTREACH (OUTPATIENT)
Dept: CASE MANAGEMENT | Age: 63
End: 2023-10-16

## 2023-10-16 NOTE — PROCEDURES
The office order for PCP removal request is Approved and finalized on October 16, 2023.     Thanks,  [facility] [employer]

## 2024-04-05 ENCOUNTER — OFFICE VISIT (OUTPATIENT)
Dept: OTOLARYNGOLOGY | Facility: CLINIC | Age: 64
End: 2024-04-05

## 2024-04-05 VITALS — WEIGHT: 160 LBS | BODY MASS INDEX: 32.25 KG/M2 | HEIGHT: 59.02 IN

## 2024-04-05 DIAGNOSIS — J34.89 NASAL DISCOMFORT: ICD-10-CM

## 2024-04-05 DIAGNOSIS — K21.9 GASTROESOPHAGEAL REFLUX DISEASE, UNSPECIFIED WHETHER ESOPHAGITIS PRESENT: ICD-10-CM

## 2024-04-05 DIAGNOSIS — F17.200 CURRENT EVERY DAY SMOKER: ICD-10-CM

## 2024-04-05 DIAGNOSIS — J38.1 VOCAL CORD POLYPS: Primary | ICD-10-CM

## 2024-04-05 PROCEDURE — 3008F BODY MASS INDEX DOCD: CPT | Performed by: SPECIALIST

## 2024-04-05 PROCEDURE — 99243 OFF/OP CNSLTJ NEW/EST LOW 30: CPT | Performed by: SPECIALIST

## 2024-04-05 PROCEDURE — 31575 DIAGNOSTIC LARYNGOSCOPY: CPT | Performed by: SPECIALIST

## 2024-04-05 RX ORDER — HYDROCORTISONE 25 MG/G
CREAM TOPICAL
COMMUNITY
Start: 2024-04-01

## 2024-04-05 RX ORDER — ROSUVASTATIN CALCIUM 10 MG/1
10 TABLET, COATED ORAL DAILY
COMMUNITY
Start: 2024-03-21 | End: 2024-06-19

## 2024-04-05 RX ORDER — OMEPRAZOLE 40 MG/1
40 CAPSULE, DELAYED RELEASE ORAL DAILY
Qty: 30 CAPSULE | Refills: 2 | Status: SHIPPED | OUTPATIENT
Start: 2024-04-05

## 2024-04-05 RX ORDER — CELECOXIB 100 MG/1
100 CAPSULE ORAL 2 TIMES DAILY
COMMUNITY
Start: 2024-02-02

## 2024-04-05 RX ORDER — CLONAZEPAM 0.25 MG/1
0.25 TABLET, ORALLY DISINTEGRATING ORAL NIGHTLY PRN
COMMUNITY
Start: 2024-02-21

## 2024-04-06 NOTE — PROGRESS NOTES
Haven Avila is a 63 year old female.   Chief Complaint   Patient presents with    Nose Problem     Feels a cut inside nose     HPI:   Here with an irritated nose.  Also has some throat irritation.    Current Outpatient Medications   Medication Sig Dispense Refill    celecoxib 100 MG Oral Cap Take 1 capsule (100 mg total) by mouth 2 (two) times daily.      clonazePAM 0.25 MG Oral Tablet Dispersible Take 1 tablet (0.25 mg total) by mouth nightly as needed. AT BEDTIME      rosuvastatin 10 MG Oral Tab Take 1 tablet (10 mg total) by mouth daily.      Omeprazole 40 MG Oral Capsule Delayed Release Take 1 capsule (40 mg total) by mouth daily. Before a meal 30 capsule 2    lisinopril 20 MG Oral Tab Take 1 tablet (20 mg total) by mouth daily.      Vitamin C 500 MG Oral Tab Take 1 tablet (500 mg total) by mouth daily.      Cholecalciferol (VITAMIN D) 1000 units Oral Tab Take by mouth.      hydrocortisone 2.5 % External Cream PLACE RECTALLY 2 TIMES DAILY. (Patient not taking: Reported on 4/5/2024)      atorvastatin 20 MG Oral Tab Take 1 tablet (20 mg total) by mouth nightly. (Patient not taking: Reported on 4/5/2024) 30 tablet 0    Omeprazole 40 MG Oral Capsule Delayed Release TOME 1 CAPSULA POR LA BOCA DIARIAMENTE ANTES DE DESAYUNO (Patient not taking: No sig reported)      acetaminophen 500 MG Oral Tab Take 500 mg by mouth every 6 (six) hours as needed for Pain. (Patient not taking: Reported on 3/17/2021)      Omega-3-acid Ethyl Esters 1 g Oral Cap Take 1 g by mouth daily. (Patient not taking: Reported on 3/17/2021)        Past Medical History:   Diagnosis Date    Anxiety state     Osteoarthritis     Visual impairment     reading glasses      Social History:  Social History     Socioeconomic History    Marital status:    Tobacco Use    Smoking status: Some Days     Packs/day: .5     Types: Cigarettes     Start date: 9/11/2015    Smokeless tobacco: Never   Vaping Use    Vaping Use: Never used   Substance and Sexual  Activity    Alcohol use: No     Alcohol/week: 0.0 standard drinks of alcohol    Drug use: No        REVIEW OF SYSTEMS:   GENERAL HEALTH: feels well otherwise  GENERAL : denies fever, chills, sweats, weight loss, weight gain  SKIN: denies any unusual skin lesions or rashes  RESPIRATORY: denies shortness of breath with exertion  NEURO: denies headaches    EXAM:    4' 11.02\" (1.499 m)   Wt 160 lb (72.6 kg)   BMI 32.29 kg/m²   System Details   Skin Inspection - Normal.   Constitutional Overall appearance - Normal.   Head/Face Facial features - Normal. Eyebrows - Normal. Skull - Normal.   Eyes Conjunctiva - Right: Normal, Left: Normal. Pupil - Right: Normal, Left: Normal.    Ears Inspection - Right: Normal, Left: Normal.   Canal - Right: Normal, Left: Normal.    TM - Right: Normal, Left: Normal.   Nasal External nose - Normal.   Nasal septum - Normal.  Turbinates - Normal.  Dry nasal mucosa.  Pettine staining from cigarettes.   Oral/Oropharynx Lips - Normal, Tonsils - Normal, Tongue - Normal    Neck Exam Inspection - Normal. Palpation - Normal. Parotid gland - Normal. Thyroid gland - Normal.   Lymph Detail Submental. Submandibular. Anterior cervical. Posterior cervical. Supraclavicular.  All without enlargement   Psychiatric Orientation - Oriented to time, place, person & situation. Appropriate mood and affect.   Neurological Memory - Normal. Cranial nerves - Cranial nerves II through XII grossly intact.   Nasopharynx Normal by fiberoptic exam   Larynx Consent was obtained for the procedure.  The nose was asesthetized with 1% neosynephrine and 4% lidocaine topical drops.    The scope was placed into the bilateral nares as well as the nasopharynx, hypopharynx and larynx.    All of which were examined.  The  entire larynx including the vallecula, epiglottis, true and false vocal cords, aryepiglottic folds, piriform sinuses and post cricord area were examined for tumors and infectious processes as well as for evidence  of reflux and retained secretions.  Vocal cord mobility was also assessed.    Any abnormalities are noted in the exam section.  Polypoid bilateral vocal cords.  Posterior laryngeal erythema.  No other masses or abnormalities noted.  No retained secretions.     ASSESSMENT AND PLAN:   1. Vocal cord polyps  Please secondary to smoking.  Patient encouraged to stop.    2. Gastroesophageal reflux disease, unspecified whether esophagitis present  Done trial of omeprazole and antireflux diet.  Follow-up in 4 weeks time, sooner if problems.    3. Current every day smoker  Encouraged to stop.    4. Nasal discomfort  Trial of Ayr nasal gel with aloe vera.      The patient indicates understanding of these issues and agrees to the plan.      Emma Rojas MD  4/5/2024  10:04 PM

## 2024-04-06 NOTE — PATIENT INSTRUCTIONS
I placed you on omeprazole and an antireflux diet for your posterior laryngeal erythema.  Please try to stop smoking.  Your vocal cords had polypoid changes due to the smoking.  No evidence of cancer.  I also asked you to use an Ayr nasal gel with aloe vera for your nasal irritation.

## 2024-06-21 ENCOUNTER — OFFICE VISIT (OUTPATIENT)
Dept: OTOLARYNGOLOGY | Facility: CLINIC | Age: 64
End: 2024-06-21

## 2024-06-21 VITALS — HEIGHT: 59.02 IN | BODY MASS INDEX: 32.25 KG/M2 | WEIGHT: 160 LBS

## 2024-06-21 DIAGNOSIS — J34.89 NASAL DISCOMFORT: Primary | ICD-10-CM

## 2024-06-21 DIAGNOSIS — K21.9 GASTROESOPHAGEAL REFLUX DISEASE, UNSPECIFIED WHETHER ESOPHAGITIS PRESENT: ICD-10-CM

## 2024-06-21 DIAGNOSIS — F17.200 CURRENT EVERY DAY SMOKER: ICD-10-CM

## 2024-06-21 PROCEDURE — 99213 OFFICE O/P EST LOW 20 MIN: CPT | Performed by: SPECIALIST

## 2024-06-21 PROCEDURE — 3008F BODY MASS INDEX DOCD: CPT | Performed by: SPECIALIST

## 2024-06-21 RX ORDER — DICLOFENAC POTASSIUM 25 MG/1
TABLET, FILM COATED ORAL
COMMUNITY
Start: 2024-06-20

## 2024-06-21 RX ORDER — INHALER, ASSIST DEVICES
1 SPACER (EA) MISCELLANEOUS AS DIRECTED
COMMUNITY
Start: 2024-04-29

## 2024-06-21 RX ORDER — NAPROXEN 375 MG/1
TABLET ORAL
COMMUNITY

## 2024-06-21 RX ORDER — NICOTINE POLACRILEX 4 MG/1
20 GUM, CHEWING ORAL DAILY
Qty: 30 TABLET | Refills: 5 | Status: SHIPPED | OUTPATIENT
Start: 2024-06-21 | End: 2024-07-21

## 2024-06-21 RX ORDER — AZELASTINE 1 MG/ML
2 SPRAY, METERED NASAL 2 TIMES DAILY
Qty: 30 ML | Refills: 5 | Status: SHIPPED | OUTPATIENT
Start: 2024-06-21

## 2024-06-21 RX ORDER — ALBUTEROL SULFATE 90 UG/1
AEROSOL, METERED RESPIRATORY (INHALATION)
COMMUNITY
Start: 2024-05-25

## 2024-06-21 NOTE — PROGRESS NOTES
Haven Avila is a 63 year old female.   Chief Complaint   Patient presents with    Follow - Up     Nose check and vocal cord polyps     HPI:   Patient here has persistent nasal symptoms.  She tried the Ayr nasal gel with aloe vera but it did not really help her.  He had to decrease her omeprazole to 20 mg as she was getting too much stomach upset with the 40 mg.    Current Outpatient Medications   Medication Sig Dispense Refill    albuterol 108 (90 Base) MCG/ACT Inhalation Aero Soln PLEASE TAKE 2 PUFFS EVERY 6 HOURS FOR WHEEZE OR A BAD COUGH      LOFENA 25 MG Oral Tab       naproxen 375 MG Oral Tab TAKE 1 TABLET BY MOUTH 2 TIMES DAILY WITH MEALS FOR 30 DAYS.      nystatin-triamcinolone 100,000-0.1 Units/g-% External Cream Apply 1 Application topically 4 (four) times daily.      Spacer/Aero-Holding Chambers (OPTICHAMBER SKYLER) Does not apply Misc Take 1 Bottle by mouth As Directed.      Omeprazole 20 MG Oral Tab EC Take 20 mg by mouth daily. 30 minutes before breakfast 30 tablet 5    azelastine 0.1 % Nasal Solution 2 sprays by Nasal route 2 (two) times daily. 30 mL 5    celecoxib 100 MG Oral Cap Take 1 capsule (100 mg total) by mouth 2 (two) times daily.      clonazePAM 0.25 MG Oral Tablet Dispersible Take 1 tablet (0.25 mg total) by mouth nightly as needed. AT BEDTIME      Omeprazole 40 MG Oral Capsule Delayed Release Take 1 capsule (40 mg total) by mouth daily. Before a meal 30 capsule 2    lisinopril 20 MG Oral Tab Take 1 tablet (20 mg total) by mouth daily.      Vitamin C 500 MG Oral Tab Take 1 tablet (500 mg total) by mouth daily.      Cholecalciferol (VITAMIN D) 1000 units Oral Tab Take by mouth.      hydrocortisone 2.5 % External Cream PLACE RECTALLY 2 TIMES DAILY. (Patient not taking: Reported on 4/5/2024)      atorvastatin 20 MG Oral Tab Take 1 tablet (20 mg total) by mouth nightly. (Patient not taking: Reported on 4/5/2024) 30 tablet 0    Omeprazole 40 MG Oral Capsule Delayed Release TOME 1 CAPSULA POR  LA BOCA DIARIAMENTE ANTES DE DESAYUNO (Patient not taking: No sig reported)      acetaminophen 500 MG Oral Tab Take 500 mg by mouth every 6 (six) hours as needed for Pain. (Patient not taking: Reported on 3/17/2021)      Omega-3-acid Ethyl Esters 1 g Oral Cap Take 1 g by mouth daily. (Patient not taking: Reported on 3/17/2021)        Past Medical History:    Anxiety state    Osteoarthritis    Visual impairment    reading glasses      Social History:  Social History     Socioeconomic History    Marital status:    Tobacco Use    Smoking status: Some Days     Current packs/day: 0.50     Average packs/day: 0.5 packs/day for 8.8 years (4.4 ttl pk-yrs)     Types: Cigarettes     Start date: 9/11/2015    Smokeless tobacco: Never   Vaping Use    Vaping status: Never Used   Substance and Sexual Activity    Alcohol use: No     Alcohol/week: 0.0 standard drinks of alcohol    Drug use: No     Social Determinants of Health      [facility]    Social Connections    [facility] from [facility]    Housing Stability        REVIEW OF SYSTEMS:   GENERAL HEALTH: feels well otherwise  GENERAL : denies fever, chills, sweats, weight loss, weight gain  SKIN: denies any unusual skin lesions or rashes  RESPIRATORY: denies shortness of breath with exertion  NEURO: denies headaches    EXAM:   Ht 4' 11.02\" (1.499 m)   Wt 160 lb (72.6 kg)   BMI 32.29 kg/m²   System Details   Skin Inspection - Normal.   Constitutional Overall appearance - Normal.   Head/Face Facial features - Normal. Eyebrows - Normal. Skull - Normal.   Eyes Conjunctiva - Right: Normal, Left: Normal. Pupil - Right: Normal, Left: Normal.    Ears Inspection - Right: Normal, Left: Normal.   Canal - Right: Normal, Left: Normal.   TM - Right: Normal, Left: Normal.   Nasal External nose - Normal.   Nasal septum - Normal.  Turbinates -turbinate congestion and  postnasal drainage.  No purulence or polyps noted   Oral/Oropharynx Lips - Normal, Tonsils - Normal, Tongue - Normal    Neck Exam Inspection - Normal. Palpation - Normal. Parotid gland - Normal. Thyroid gland - Normal.   Lymph Detail Submental. Submandibular. Anterior cervical. Posterior cervical. Supraclavicular all without enlargement   Psychiatric Orientation - Oriented to time, place, person & situation. Appropriate mood and affect.   Larynx Less posterior laryngeal erythema.  No retained secretions.  No tumors or masses seen.   Neurological Memory - Normal. Cranial nerves - Cranial nerves II through XII grossly intact.     ASSESSMENT AND PLAN:   1. Nasal discomfort  Trial of Astelin nasal spray.  2 sprays to each nostril twice daily.    2. Current every day smoker  Once again counseled to try to stop smoking.    3. Gastroesophageal reflux disease, unspecified whether esophagitis present  Decreased from 40 to 20 mg of omeprazole daily.  Also to continue reflux diet.  Follow-up in 3 to 4 weeks time, sooner if problems.        The patient indicates understanding of these issues and agrees to the plan.      Emma Rojas MD  6/21/2024  12:43 PM

## 2024-06-21 NOTE — PATIENT INSTRUCTIONS
I placed you on a trial of Astelin nasal spray 2 sprays to each nostril twice daily.  We decreased her omeprazole to 20 mg once in the morning.  We once again discussed trying to stop smoking.  Follow-up in 3 to 4 weeks time, sooner if problems.

## 2024-07-19 ENCOUNTER — LAB ENCOUNTER (OUTPATIENT)
Dept: LAB | Age: 64
End: 2024-07-19
Attending: SPECIALIST
Payer: COMMERCIAL

## 2024-07-19 ENCOUNTER — OFFICE VISIT (OUTPATIENT)
Dept: OTOLARYNGOLOGY | Facility: CLINIC | Age: 64
End: 2024-07-19

## 2024-07-19 VITALS — BODY MASS INDEX: 32.25 KG/M2 | WEIGHT: 160 LBS | HEIGHT: 59.02 IN

## 2024-07-19 DIAGNOSIS — F17.200 CURRENT EVERY DAY SMOKER: ICD-10-CM

## 2024-07-19 DIAGNOSIS — R09.82 ALLERGIC RHINITIS WITH POSTNASAL DRIP: Primary | ICD-10-CM

## 2024-07-19 DIAGNOSIS — J30.9 ALLERGIC RHINITIS WITH POSTNASAL DRIP: ICD-10-CM

## 2024-07-19 DIAGNOSIS — J30.9 ALLERGIC RHINITIS WITH POSTNASAL DRIP: Primary | ICD-10-CM

## 2024-07-19 DIAGNOSIS — K21.9 GASTROESOPHAGEAL REFLUX DISEASE, UNSPECIFIED WHETHER ESOPHAGITIS PRESENT: ICD-10-CM

## 2024-07-19 DIAGNOSIS — R09.82 ALLERGIC RHINITIS WITH POSTNASAL DRIP: ICD-10-CM

## 2024-07-19 PROCEDURE — 86003 ALLG SPEC IGE CRUDE XTRC EA: CPT

## 2024-07-19 PROCEDURE — 36415 COLL VENOUS BLD VENIPUNCTURE: CPT

## 2024-07-19 PROCEDURE — 82785 ASSAY OF IGE: CPT

## 2024-07-19 PROCEDURE — 99213 OFFICE O/P EST LOW 20 MIN: CPT | Performed by: SPECIALIST

## 2024-07-19 PROCEDURE — 3008F BODY MASS INDEX DOCD: CPT | Performed by: SPECIALIST

## 2024-07-19 RX ORDER — LEVOCETIRIZINE DIHYDROCHLORIDE 5 MG/1
5 TABLET, FILM COATED ORAL EVERY EVENING
Qty: 30 TABLET | Refills: 3 | Status: SHIPPED | OUTPATIENT
Start: 2024-07-19

## 2024-07-19 RX ORDER — HYDROCORTISONE ACETATE 25 MG/1
25 SUPPOSITORY RECTAL 2 TIMES DAILY
COMMUNITY
Start: 2024-02-21

## 2024-07-19 RX ORDER — GABAPENTIN 300 MG/1
CAPSULE ORAL
COMMUNITY
Start: 2024-01-22

## 2024-07-20 NOTE — PATIENT INSTRUCTIONS
Continue the omeprazole 20 mg and antireflux diet for your reflux disease.  Try to stop smoking.  I ordered allergy testing for your probable allergic rhinitis with postnasal drip.  I also placed you on a trial of Xyzal.  You can continue to use the olopatadine eyedrops on an as needed basis.

## 2024-07-20 NOTE — PROGRESS NOTES
Haven vAila is a 63 year old female.   Chief Complaint   Patient presents with    Follow - Up     nasal discomfort     HPI:   Patient here with severe allergic symptoms including of her eyes.  Is feeling better on the 20 mg of omeprazole and antireflux diet.    Current Outpatient Medications   Medication Sig Dispense Refill    gabapentin 300 MG Oral Cap TAKE 1 CAP EVERY EVENING FOR 1 WEEK, 1 CAP TWICE DAILY FOR 1 WEEK, THEN 1 CAP 3 TIMES DAILY ONWARD      hydrocortisone 25 MG Rectal Suppos Place 1 suppository (25 mg total) rectally 2 (two) times daily.      levocetirizine 5 MG Oral Tab Take 1 tablet (5 mg total) by mouth every evening. 30 tablet 3    albuterol 108 (90 Base) MCG/ACT Inhalation Aero Soln PLEASE TAKE 2 PUFFS EVERY 6 HOURS FOR WHEEZE OR A BAD COUGH      LOFENA 25 MG Oral Tab       naproxen 375 MG Oral Tab TAKE 1 TABLET BY MOUTH 2 TIMES DAILY WITH MEALS FOR 30 DAYS.      nystatin-triamcinolone 100,000-0.1 Units/g-% External Cream Apply 1 Application topically 4 (four) times daily.      Spacer/Aero-Holding Chambers (OPTICHAMBER SKYLER) Does not apply Misc Take 1 Bottle by mouth As Directed.      Omeprazole 20 MG Oral Tab EC Take 20 mg by mouth daily. 30 minutes before breakfast 30 tablet 5    azelastine 0.1 % Nasal Solution 2 sprays by Nasal route 2 (two) times daily. 30 mL 5    celecoxib 100 MG Oral Cap Take 1 capsule (100 mg total) by mouth 2 (two) times daily.      clonazePAM 0.25 MG Oral Tablet Dispersible Take 1 tablet (0.25 mg total) by mouth nightly as needed. AT BEDTIME      Omeprazole 40 MG Oral Capsule Delayed Release Take 1 capsule (40 mg total) by mouth daily. Before a meal 30 capsule 2    lisinopril 20 MG Oral Tab Take 1 tablet (20 mg total) by mouth daily.      Vitamin C 500 MG Oral Tab Take 1 tablet (500 mg total) by mouth daily.      Cholecalciferol (VITAMIN D) 1000 units Oral Tab Take by mouth.      hydrocortisone 2.5 % External Cream PLACE RECTALLY 2 TIMES DAILY. (Patient not taking:  Reported on 4/5/2024)      atorvastatin 20 MG Oral Tab Take 1 tablet (20 mg total) by mouth nightly. (Patient not taking: Reported on 4/5/2024) 30 tablet 0    Omeprazole 40 MG Oral Capsule Delayed Release TOME 1 CAPSULA POR LA BOCA DIARIAMENTE ANTES DE DESAYUNO (Patient not taking: No sig reported)      acetaminophen 500 MG Oral Tab Take 500 mg by mouth every 6 (six) hours as needed for Pain. (Patient not taking: Reported on 3/17/2021)      Omega-3-acid Ethyl Esters 1 g Oral Cap Take 1 g by mouth daily. (Patient not taking: Reported on 3/17/2021)        Past Medical History:    Anxiety state    Osteoarthritis    Visual impairment    reading glasses      Social History:  Social History     Socioeconomic History    Marital status:    Tobacco Use    Smoking status: Some Days     Current packs/day: 0.50     Average packs/day: 0.5 packs/day for 8.9 years (4.4 ttl pk-yrs)     Types: Cigarettes     Start date: 9/11/2015    Smokeless tobacco: Never   Vaping Use    Vaping status: Never Used   Substance and Sexual Activity    Alcohol use: No     Alcohol/week: 0.0 standard drinks of alcohol    Drug use: No     Social Determinants of Health      [facility]    Social Connections    [facility]    Housing Stability        REVIEW OF SYSTEMS:   GENERAL HEALTH: feels well otherwise  GENERAL : denies fever, chills, sweats, weight loss, weight gain  SKIN: denies any unusual skin lesions or rashes  RESPIRATORY: denies shortness of breath with exertion  NEURO: denies headaches    EXAM:    4' 11.02\" (1.499 m)   Wt 160 lb (72.6 kg)   BMI 32.29 kg/m²   System Details   Skin Inspection - Normal.   Constitutional Overall appearance - Normal.   Head/Face Facial features - Normal. Eyebrows - Normal. Skull - Normal.   Eyes Conjunctiva - Right: Normal, Left: Normal. Pupil - Right: Normal, Left: Normal.   Some lid edema and irritation of her sclera.   Ears Inspection - Right: Normal, Left: Normal.   Canal - Right: Normal, Left: Normal.   TM - Right: Normal, Left: Normal.   Nasal External nose - Normal.   Nasal septum - Normal.  Turbinates -congestion, no purulence or polyps noted   Oral/Oropharynx Lips - Normal, Tonsils - Normal, Tongue - Normal    Neck Exam Inspection - Normal. Palpation - Normal. Parotid gland - Normal. Thyroid gland - Normal.   Lymph Detail Submental. Submandibular. Anterior cervical. Posterior cervical. Supraclavicular all without enlargement   Psychiatric Orientation - Oriented to time, place, person & situation. Appropriate mood and affect.   Neurological Memory - Normal. Cranial nerves - Cranial nerves II through XII grossly intact.     ASSESSMENT AND PLAN:   1. Allergic rhinitis with postnasal drip  Patient placed on trial of Xyzal.  Could not tolerate the Astelin nasal spray as it caused a decrease in her sense of taste.  Patient was sent for blood testing to see what her allergic triggers might be.  - Allergens, Zone 8; Future    2. Current every day smoker  Advised to stop.    3. Gastroesophageal reflux disease, unspecified whether esophagitis present  Symptoms are improving on 20 mg of omeprazole and antireflux diet.  Patient to continue the same.      The patient indicates understanding of these issues and agrees to the plan.      Emma Rojas MD  7/19/2024  9:09 PM

## 2024-07-24 LAB

## 2024-07-24 NOTE — PROGRESS NOTES
Negative allergy testing.  Continue xyzal only if it is improving symptoms.  If not patient to call or follow up.

## 2024-07-29 ENCOUNTER — TELEPHONE (OUTPATIENT)
Dept: OTOLARYNGOLOGY | Facility: CLINIC | Age: 64
End: 2024-07-29

## 2024-07-29 NOTE — TELEPHONE ENCOUNTER
Per patient she is bleeding in her ears, was seen at [facility]. Please advise

## 2024-07-29 NOTE — TELEPHONE ENCOUNTER
Pt declined appt tomorrow, would like to be seen at [facility],no hearing loss. Pt scheduled with with Parrish on 08/01 at [facility] office

## 2024-08-16 ENCOUNTER — OFFICE VISIT (OUTPATIENT)
Dept: OTOLARYNGOLOGY | Facility: CLINIC | Age: 64
End: 2024-08-16

## 2024-08-16 VITALS — HEIGHT: 59 IN | WEIGHT: 154 LBS | BODY MASS INDEX: 31.04 KG/M2

## 2024-08-16 DIAGNOSIS — G44.319 ACUTE POST-TRAUMATIC HEADACHE, NOT INTRACTABLE: ICD-10-CM

## 2024-08-16 DIAGNOSIS — J30.0 VASOMOTOR RHINITIS: ICD-10-CM

## 2024-08-16 DIAGNOSIS — H92.23 OTORRHAGIA OF BOTH EARS: Primary | ICD-10-CM

## 2024-08-16 PROCEDURE — 3008F BODY MASS INDEX DOCD: CPT | Performed by: SPECIALIST

## 2024-08-16 PROCEDURE — 99214 OFFICE O/P EST MOD 30 MIN: CPT | Performed by: SPECIALIST

## 2024-08-16 RX ORDER — AMITRIPTYLINE HYDROCHLORIDE 10 MG/1
TABLET, FILM COATED ORAL
COMMUNITY
Start: 2021-10-12

## 2024-08-16 RX ORDER — BUTALBITAL, ACETAMINOPHEN AND CAFFEINE 50; 325; 40 MG/1; MG/1; MG/1
TABLET ORAL
COMMUNITY
Start: 2024-08-02

## 2024-08-16 RX ORDER — AMITRIPTYLINE HYDROCHLORIDE 25 MG/1
25 TABLET, FILM COATED ORAL NIGHTLY
COMMUNITY
Start: 2024-08-13

## 2024-08-16 RX ORDER — ERGOCALCIFEROL 1.25 MG/1
CAPSULE ORAL
COMMUNITY
Start: 2021-10-12

## 2024-08-16 RX ORDER — EZETIMIBE 10 MG/1
TABLET ORAL
COMMUNITY
Start: 2021-10-12

## 2024-08-16 RX ORDER — AMOXICILLIN AND CLAVULANATE POTASSIUM 875; 125 MG/1; MG/1
1 TABLET, FILM COATED ORAL 2 TIMES DAILY
COMMUNITY
Start: 2024-07-27

## 2024-08-17 NOTE — PROGRESS NOTES
Haven Avila is a 63 year old female.   Chief Complaint   Patient presents with    Ear Problem     Patient reports blood in both ears.     HPI:   Patient here had blood of both of her ears and headache post trauma to her head.    Current Outpatient Medications   Medication Sig Dispense Refill    amitriptyline 10 MG Oral Tab       amitriptyline 25 MG Oral Tab Take 1 tablet (25 mg total) by mouth nightly.      butalbital-acetaminophen-caffeine -40 MG Oral Tab TAKE 2 TABLETS BY MOUTH 3 (THREE) TIMES DAILY AS NEEDED FOR PAIN FOR UP TO 10 DAYS.      ergocalciferol 1.25 MG (23826 UT) Oral Cap       ezetimibe 10 MG Oral Tab       amoxicillin clavulanate 875-125 MG Oral Tab Take 1 tablet by mouth 2 (two) times daily. (Patient not taking: Reported on 8/16/2024)      neomycin-polymyxin-hydrocortisone 3.5-30134-6 Otic Solution Place 2 drops in ear(s). (Patient not taking: Reported on 8/16/2024)      gabapentin 300 MG Oral Cap TAKE 1 CAP EVERY EVENING FOR 1 WEEK, 1 CAP TWICE DAILY FOR 1 WEEK, THEN 1 CAP 3 TIMES DAILY ONWARD      hydrocortisone 25 MG Rectal Suppos Place 1 suppository (25 mg total) rectally 2 (two) times daily.      levocetirizine 5 MG Oral Tab Take 1 tablet (5 mg total) by mouth every evening. 30 tablet 3    albuterol 108 (90 Base) MCG/ACT Inhalation Aero Soln PLEASE TAKE 2 PUFFS EVERY 6 HOURS FOR WHEEZE OR A BAD COUGH      LOFENA 25 MG Oral Tab       naproxen 375 MG Oral Tab TAKE 1 TABLET BY MOUTH 2 TIMES DAILY WITH MEALS FOR 30 DAYS.      nystatin-triamcinolone 100,000-0.1 Units/g-% External Cream Apply 1 Application topically 4 (four) times daily.      Spacer/Aero-Holding Chambers (OPTICHAMBER SKYLER) Does not apply Misc Take 1 Bottle by mouth As Directed.      azelastine 0.1 % Nasal Solution 2 sprays by Nasal route 2 (two) times daily. 30 mL 5    celecoxib 100 MG Oral Cap Take 1 capsule (100 mg total) by mouth 2 (two) times daily.      clonazePAM 0.25 MG Oral Tablet Dispersible Take 1 tablet (0.25  mg total) by mouth nightly as needed. AT BEDTIME      hydrocortisone 2.5 % External Cream PLACE RECTALLY 2 TIMES DAILY. (Patient not taking: Reported on 4/5/2024)      Omeprazole 40 MG Oral Capsule Delayed Release Take 1 capsule (40 mg total) by mouth daily. Before a meal 30 capsule 2    atorvastatin 20 MG Oral Tab Take 1 tablet (20 mg total) by mouth nightly. (Patient not taking: Reported on 4/5/2024) 30 tablet 0    lisinopril 20 MG Oral Tab Take 1 tablet (20 mg total) by mouth daily.      Omeprazole 40 MG Oral Capsule Delayed Release TOME 1 CAPSULA POR LA BOCA DIARIAMENTE ANTES DE DESAYUNO (Patient not taking: No sig reported)      acetaminophen 500 MG Oral Tab Take 500 mg by mouth every 6 (six) hours as needed for Pain. (Patient not taking: Reported on 3/17/2021)      Vitamin C 500 MG Oral Tab Take 1 tablet (500 mg total) by mouth daily.      Cholecalciferol (VITAMIN D) 1000 units Oral Tab Take by mouth.      Omega-3-acid Ethyl Esters 1 g Oral Cap Take 1 g by mouth daily. (Patient not taking: Reported on 3/17/2021)        Past Medical History:    Anxiety state    Osteoarthritis    Visual impairment    reading glasses      Social History:  Social History     Socioeconomic History    Marital status:    Tobacco Use    Smoking status: Some Days     Current packs/day: 0.50     Average packs/day: 0.5 packs/day for 8.9 years (4.5 ttl pk-yrs)     Types: Cigarettes     Start date: 9/11/2015    Smokeless tobacco: Never   Vaping Use    Vaping status: Never Used   Substance and Sexual Activity    Alcohol use: No     Alcohol/week: 0.0 standard drinks of alcohol    Drug use: No     Social Determinants of Health      Received from [facility], [facility]    Social Connections    Received from [facility], [facility]    Housing Stability        REVIEW OF SYSTEMS:   GENERAL HEALTH: feels well otherwise  GENERAL : denies fever, chills, sweats,  weight loss, weight gain  SKIN: denies any unusual skin lesions or rashes  RESPIRATORY: denies shortness of breath with exertion  NEURO: denies headaches    EXAM:   Ht 4' 11\" (1.499 m)   Wt 154 lb (69.9 kg)   BMI 31.10 kg/m²   System Details   Skin Inspection - Normal.   Constitutional Overall appearance - Normal.   Head/Face Facial features - Normal. Eyebrows - Normal. Skull - Normal.   Eyes Conjunctiva - Right: Normal, Left: Normal. Pupil - Right: Normal, Left: Normal.    Ears Inspection - Right: Normal, Left: Normal.   Canal -slight blood in the right external auditory canal, dried, fully cleaned.  None seen on the left.  TM - Right: Normal, Left: Normal.  Both tympanic membranes intact.   Nasal External nose - Normal.   Nasal septum - Normal.  Turbinates -right congestion.   Oral/Oropharynx Lips - Normal, Tonsils - Normal, Tongue - Normal    Neck Exam Inspection - Normal. Palpation - Normal. Parotid gland - Normal. Thyroid gland - Normal.   Lymph Detail Submental. Submandibular. Anterior cervical. Posterior cervical. Supraclavicular all without enlargement   Psychiatric Orientation - Oriented to time, place, person & situation. Appropriate mood and affect.   Neurological Memory - Normal. Cranial nerves - Cranial nerves II through XII grossly intact.     ASSESSMENT AND PLAN:   1. Otorrhagia of both ears  Dried blood in the right ear fully cleaned.  None seen on the left.  Tympanic membrane's normal.    2. Vasomotor rhinitis  Reviewed RAST testing from 7/19/2024 which was negative.  Patient can consider Xyzal if she feels it helps with her congestion.  If not, she can stop it.    3. Acute post-traumatic headache, not intractable  Seeing her neurologist for this.  I have recommended her to follow-up with them.  Reviewed CT of the head done on 7/22/2024 and 7/27/2024 both are negative for acute skull fracture.      The patient indicates understanding of these issues and agrees to the plan.      Emma Rojas,  MD  8/16/2024  8:01 PM

## 2024-08-17 NOTE — PATIENT INSTRUCTIONS
Right blood was cleaned from the right ear.  No blood was seen on the left.  Tympanic membrane's are normal.  Please follow-up with the neurologist for your posttraumatic headaches.  We reviewed your allergy testing done on 7/19/2024 which was negative.  This suggest vasomotor rhinitis.  You can stay on the Xyzal if it seems to be helping if not you can stop it.

## 2024-11-22 RX ORDER — LEVOCETIRIZINE DIHYDROCHLORIDE 5 MG/1
5 TABLET, FILM COATED ORAL EVERY EVENING
Qty: 90 TABLET | Refills: 0 | Status: SHIPPED | OUTPATIENT
Start: 2024-11-22

## 2025-02-26 ENCOUNTER — HOSPITAL ENCOUNTER (EMERGENCY)
Facility: HOSPITAL | Age: 65
Discharge: HOME | End: 2025-02-26
Attending: EMERGENCY MEDICINE
Payer: COMMERCIAL

## 2025-02-26 ENCOUNTER — APPOINTMENT (OUTPATIENT)
Dept: GENERAL RADIOLOGY | Facility: HOSPITAL | Age: 65
End: 2025-02-26
Attending: EMERGENCY MEDICINE
Payer: COMMERCIAL

## 2025-02-26 VITALS
DIASTOLIC BLOOD PRESSURE: 82 MMHG | WEIGHT: 156 LBS | RESPIRATION RATE: 14 BRPM | SYSTOLIC BLOOD PRESSURE: 133 MMHG | BODY MASS INDEX: 32 KG/M2 | TEMPERATURE: 97 F | HEART RATE: 59 BPM | OXYGEN SATURATION: 95 %

## 2025-02-26 DIAGNOSIS — S39.012A LOW BACK STRAIN, INITIAL ENCOUNTER: ICD-10-CM
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
## 2025-02-26 DIAGNOSIS — S90.02XA CONTUSION OF LEFT ANKLE, INITIAL ENCOUNTER: ICD-10-CM

## 2025-02-26 PROCEDURE — 73590 X-RAY EXAM OF LOWER LEG: CPT | Performed by: EMERGENCY MEDICINE

## 2025-02-26 PROCEDURE — 99284 EMERGENCY DEPT VISIT MOD MDM: CPT

## 2025-02-26 RX ORDER — CYCLOBENZAPRINE HCL 10 MG
10 TABLET ORAL 3 TIMES DAILY PRN
Qty: 20 TABLET | Refills: 0 | Status: SHIPPED | OUTPATIENT
Start: 2025-02-26 | End: 2025-03-05

## 2025-02-26 RX ORDER — ACETAMINOPHEN 500 MG
1000 TABLET ORAL ONCE
Status: COMPLETED | OUTPATIENT
Start: 2025-02-26 | End: 2025-02-26

## 2025-02-26 RX ORDER — KETOROLAC TROMETHAMINE 10 MG/1
10 TABLET, FILM COATED ORAL EVERY 6 HOURS PRN
Qty: 20 TABLET | Refills: 0 | Status: SHIPPED | OUTPATIENT
Start: 2025-02-26 | End: 2025-03-03

## 2025-02-26 RX ORDER — IBUPROFEN 600 MG/1
600 TABLET, FILM COATED ORAL ONCE
Status: COMPLETED | OUTPATIENT
Start: 2025-02-26 | End: 2025-02-26
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
## 2025-03-11 RX ORDER — OMEPRAZOLE 20 MG/1
20 CAPSULE, DELAYED RELEASE ORAL
Qty: 90 CAPSULE | Refills: 0 | Status: SHIPPED | OUTPATIENT
Start: 2025-03-11

## 2025-06-09 RX ORDER — OMEPRAZOLE 20 MG/1
20 CAPSULE, DELAYED RELEASE ORAL
Qty: 90 CAPSULE | Refills: 0 | Status: SHIPPED | OUTPATIENT
Start: 2025-06-09

## (undated) DEVICE — ZIMMER® STERILE DISPOSABLE TOURNIQUET CUFF WITH PLC, DUAL PORT, SINGLE BLADDER, 30 IN. (76 CM)

## (undated) DEVICE — SOL  .9 3000ML

## (undated) DEVICE — 2DE14 2-0 PDO 24 X 24: Brand: 2DE14 2-0 PDO 24 X 24

## (undated) DEVICE — STERILE POLYISOPRENE POWDER-FREE SURGICAL GLOVES: Brand: PROTEXIS

## (undated) DEVICE — BLADE SAW SAGITTAL 19.5

## (undated) DEVICE — SMOOTH PINS PACK: Brand: KNEE INSTRUMENTS

## (undated) DEVICE — 2T11 #2 PDO 36 X 36: Brand: 2T11 #2 PDO 36 X 36

## (undated) DEVICE — Device

## (undated) DEVICE — DRESSING BORDER AQUACEL 4X10

## (undated) DEVICE — 3M™ STERI-DRAPE™ U-DRAPE 1015: Brand: STERI-DRAPE™

## (undated) DEVICE — DRAPE SHEET LG

## (undated) DEVICE — PAD THRP 16IN WRPON MU LNG STM

## (undated) DEVICE — 60 ML SYRINGE LUER-LOCK TIP: Brand: MONOJECT

## (undated) DEVICE — SCREWS PACK: Brand: KNEE INSTRUMENTS

## (undated) DEVICE — SUTURE VICRYL 2-0 FS-1

## (undated) DEVICE — SHORT THREADED PINS PACK: Brand: KNEE INSTRUMENTS

## (undated) DEVICE — CHLORAPREP 26ML APPLICATOR

## (undated) DEVICE — TOTAL KNEE: Brand: MEDLINE INDUSTRIES, INC.

## (undated) DEVICE — STERILE LATEX POWDER-FREE SURGICAL GLOVES WITH HYDROGEL COATING, SMOOTH FINISH, STRAIGHT FINGER: Brand: PROTEXIS

## (undated) DEVICE — MYKNEE PPS STD FEMDISCUTBLOCK-MRI-GMK-RM-#2+: Brand: MYKNEE PPS

## (undated) DEVICE — POLAR CARE CUBE COOLING UNIT

## (undated) DEVICE — NEEDLE SPINAL 20X3-1/2 YELLOW

## (undated) DEVICE — DUAL CUT SAGITTAL BLADE

## (undated) DEVICE — DERMABOND LIQUID ADHESIVE

## (undated) DEVICE — MYKNEE FEMUR BONE MODEL RIGHT: Brand: MY KNEE BONE MODELS

## (undated) DEVICE — COTTON ROLL: Brand: DEROYAL

## (undated) DEVICE — CEMENT MIXING SYSTEM WITH FEMORAL BREAKWAY NOZZLE: Brand: REVOLUTION

## (undated) DEVICE — BANDAGE ROLL,100% COTTON, 6 PLY, LARGE: Brand: KERLIX

## (undated) DEVICE — MYKNEE MIS TIBIAL BONE MODEL RIGHT MEDIAL: Brand: MY KNEE BONE MODELS

## (undated) DEVICE — BANDAGE FLXMSTR 11YDX6IN STRL

## (undated) DEVICE — BATTERY

## (undated) DEVICE — POUCH: SSEAL TYVEK 2000/CS: Brand: MEDICAL ACTION INDUSTRIES

## (undated) DEVICE — CONTAINER SPEC STR 4OZ GRY LID

## (undated) DEVICE — GAUZE SPONGES,12 PLY: Brand: CURITY

## (undated) DEVICE — VIOLET BRAIDED (POLYGLACTIN 910), SYNTHETIC ABSORBABLE SUTURE: Brand: COATED VICRYL

## (undated) DEVICE — T5 HOOD WITH PEEL AWAY FACE SHIELD

## (undated) DEVICE — MYKNEE PPS MIS TIBCUTBLOCK-MRI-GMK-RM-#2: Brand: MYKNEE PPS